# Patient Record
Sex: FEMALE | Race: WHITE | NOT HISPANIC OR LATINO | Employment: OTHER | URBAN - NONMETROPOLITAN AREA
[De-identification: names, ages, dates, MRNs, and addresses within clinical notes are randomized per-mention and may not be internally consistent; named-entity substitution may affect disease eponyms.]

---

## 2017-09-12 ENCOUNTER — TRANSCRIBE ORDERS (OUTPATIENT)
Dept: MAMMOGRAPHY | Facility: HOSPITAL | Age: 77
End: 2017-09-12

## 2017-09-12 DIAGNOSIS — Z13.9 SCREENING: Primary | ICD-10-CM

## 2017-10-01 ENCOUNTER — HOSPITAL ENCOUNTER (EMERGENCY)
Facility: HOSPITAL | Age: 77
Discharge: HOME | End: 2017-10-01
Attending: EMERGENCY MEDICINE | Admitting: EMERGENCY MEDICINE

## 2017-10-01 ENCOUNTER — APPOINTMENT (OUTPATIENT)
Dept: GENERAL RADIOLOGY | Facility: HOSPITAL | Age: 77
End: 2017-10-01

## 2017-10-01 VITALS
HEIGHT: 65 IN | DIASTOLIC BLOOD PRESSURE: 73 MMHG | WEIGHT: 160 LBS | OXYGEN SATURATION: 96 % | TEMPERATURE: 98.7 F | RESPIRATION RATE: 20 BRPM | BODY MASS INDEX: 26.66 KG/M2 | SYSTOLIC BLOOD PRESSURE: 138 MMHG | HEART RATE: 71 BPM

## 2017-10-01 DIAGNOSIS — L03.115 CELLULITIS OF FOOT, RIGHT: Primary | ICD-10-CM

## 2017-10-01 LAB
ANION GAP SERPL CALCULATED.3IONS-SCNC: 14 MMOL/L
BASOPHILS # BLD AUTO: 0.03 10*3/MM3 (ref 0–0.2)
BASOPHILS NFR BLD AUTO: 0.4 % (ref 0–2.5)
BUN BLD-MCNC: 24 MG/DL (ref 7–20)
BUN/CREAT SERPL: 21.8 (ref 7.1–23.5)
CALCIUM SPEC-SCNC: 9.1 MG/DL (ref 8.4–10.2)
CHLORIDE SERPL-SCNC: 107 MMOL/L (ref 98–107)
CO2 SERPL-SCNC: 26 MMOL/L (ref 26–30)
CREAT BLD-MCNC: 1.1 MG/DL (ref 0.6–1.3)
DEPRECATED RDW RBC AUTO: 47 FL (ref 37–54)
EOSINOPHIL # BLD AUTO: 0.1 10*3/MM3 (ref 0–0.7)
EOSINOPHIL NFR BLD AUTO: 1.2 % (ref 0–7)
ERYTHROCYTE [DISTWIDTH] IN BLOOD BY AUTOMATED COUNT: 13.3 % (ref 11.5–14.5)
GFR SERPL CREATININE-BSD FRML MDRD: 48 ML/MIN/1.73
GLUCOSE BLD-MCNC: 134 MG/DL (ref 74–98)
HCT VFR BLD AUTO: 35.4 % (ref 37–47)
HGB BLD-MCNC: 11.7 G/DL (ref 12–16)
IMM GRANULOCYTES # BLD: 0.03 10*3/MM3 (ref 0–0.06)
IMM GRANULOCYTES NFR BLD: 0.4 % (ref 0–0.6)
LYMPHOCYTES # BLD AUTO: 1.51 10*3/MM3 (ref 0.6–3.4)
LYMPHOCYTES NFR BLD AUTO: 18.6 % (ref 10–50)
MCH RBC QN AUTO: 31.6 PG (ref 27–31)
MCHC RBC AUTO-ENTMCNC: 33.1 G/DL (ref 30–37)
MCV RBC AUTO: 95.7 FL (ref 81–99)
MONOCYTES # BLD AUTO: 0.67 10*3/MM3 (ref 0–0.9)
MONOCYTES NFR BLD AUTO: 8.3 % (ref 0–12)
NEUTROPHILS # BLD AUTO: 5.77 10*3/MM3 (ref 2–6.9)
NEUTROPHILS NFR BLD AUTO: 71.1 % (ref 37–80)
NRBC BLD MANUAL-RTO: 0 /100 WBC (ref 0–0)
PLAT MORPH BLD: NORMAL
PLATELET # BLD AUTO: 200 10*3/MM3 (ref 130–400)
PMV BLD AUTO: 10.6 FL (ref 6–12)
POTASSIUM BLD-SCNC: 4 MMOL/L (ref 3.5–5.1)
RBC # BLD AUTO: 3.7 10*6/MM3 (ref 4.2–5.4)
RBC MORPH BLD: NORMAL
SODIUM BLD-SCNC: 143 MMOL/L (ref 137–145)
WBC MORPH BLD: NORMAL
WBC NRBC COR # BLD: 8.11 10*3/MM3 (ref 4.8–10.8)

## 2017-10-01 PROCEDURE — 80048 BASIC METABOLIC PNL TOTAL CA: CPT | Performed by: EMERGENCY MEDICINE

## 2017-10-01 PROCEDURE — 85025 COMPLETE CBC W/AUTO DIFF WBC: CPT | Performed by: EMERGENCY MEDICINE

## 2017-10-01 PROCEDURE — 87077 CULTURE AEROBIC IDENTIFY: CPT | Performed by: EMERGENCY MEDICINE

## 2017-10-01 PROCEDURE — 87070 CULTURE OTHR SPECIMN AEROBIC: CPT | Performed by: EMERGENCY MEDICINE

## 2017-10-01 PROCEDURE — 87186 SC STD MICRODIL/AGAR DIL: CPT | Performed by: EMERGENCY MEDICINE

## 2017-10-01 PROCEDURE — 87147 CULTURE TYPE IMMUNOLOGIC: CPT | Performed by: EMERGENCY MEDICINE

## 2017-10-01 PROCEDURE — 85007 BL SMEAR W/DIFF WBC COUNT: CPT | Performed by: EMERGENCY MEDICINE

## 2017-10-01 PROCEDURE — 99283 EMERGENCY DEPT VISIT LOW MDM: CPT

## 2017-10-01 PROCEDURE — 73630 X-RAY EXAM OF FOOT: CPT

## 2017-10-01 RX ORDER — SENNOSIDES 8.6 MG
650 CAPSULE ORAL EVERY 8 HOURS PRN
COMMUNITY

## 2017-10-01 RX ORDER — ROPINIROLE 0.25 MG/1
0.25 TABLET, FILM COATED ORAL 3 TIMES DAILY
COMMUNITY

## 2017-10-01 RX ORDER — PREGABALIN 100 MG/1
100 CAPSULE ORAL 2 TIMES DAILY
COMMUNITY

## 2017-10-01 RX ORDER — DOXYCYCLINE 100 MG/1
100 CAPSULE ORAL ONCE
Status: COMPLETED | OUTPATIENT
Start: 2017-10-01 | End: 2017-10-01

## 2017-10-01 RX ORDER — CEPHALEXIN 250 MG/1
500 CAPSULE ORAL ONCE
Status: COMPLETED | OUTPATIENT
Start: 2017-10-01 | End: 2017-10-01

## 2017-10-01 RX ORDER — ATENOLOL 50 MG/1
100 TABLET ORAL DAILY
COMMUNITY

## 2017-10-01 RX ORDER — ROPINIROLE 0.25 MG/1
0.25 TABLET, FILM COATED ORAL ONCE
Status: COMPLETED | OUTPATIENT
Start: 2017-10-01 | End: 2017-10-01

## 2017-10-01 RX ORDER — CEPHALEXIN 500 MG/1
500 CAPSULE ORAL 3 TIMES DAILY
Qty: 30 CAPSULE | Refills: 0 | Status: SHIPPED | OUTPATIENT
Start: 2017-10-01 | End: 2017-10-11

## 2017-10-01 RX ORDER — DOXYCYCLINE 50 MG/1
50 CAPSULE ORAL 2 TIMES DAILY
Qty: 20 CAPSULE | Refills: 0 | Status: SHIPPED | OUTPATIENT
Start: 2017-10-01 | End: 2017-10-11

## 2017-10-01 RX ADMIN — ROPINIROLE 0.25 MG: 0.25 TABLET, FILM COATED ORAL at 15:35

## 2017-10-01 RX ADMIN — CEPHALEXIN 500 MG: 250 CAPSULE ORAL at 16:01

## 2017-10-01 RX ADMIN — DOXYCYCLINE 100 MG: 100 CAPSULE ORAL at 16:01

## 2017-10-01 NOTE — ED NOTES
Right foot wound dressed with non adherent dressing, tolerated well. Verbalized understanding of wound care at home. Also verbalized understanding that she needs to be reevaluated if symptoms worsen, Dr. Byrd will be working tomorrow and family is aware that if she is to worsen they are to bring her back to consider admission at that time. Patient denies any feelings of illness at time of discharge, family was concerned about patient returning home because she is the caregiver for her , we discussed extensively that patient must rest in order to heal. Also discussed follow up with Dr. Sparrow, verbalized understanding.      Miriam Rodriguez RN  10/01/17 1304

## 2017-10-01 NOTE — ED PROVIDER NOTES
Subjective   History of Present Illness  TRIAGE CHIEF COMPLAINT:   Chief Complaint   Patient presents with   • Cellulitis         HPI: Latoya Molina   is a 77 y.o. female   who presents to the emergency department complaining of R foot infection. Patient is a very poor historian.  Has a history of hypertension, dyslipidemia, peripheral neuropathy.  Today patient noticed that her right foot was red and that her right second toe was swollen.  She is concern for infection.  Patient states she does not check her feet on a regular basis and is uncertain how long this has been going on.  As today is Sunday she was unable to go in and see her doctor so she came to the emergency department.  Denies any systemic signs or symptoms including fever, chills, nausea, vomiting, cough, chest pain, shortness of breath, abdominal pain.           Review of Systems   All other systems reviewed and are negative.      Past Medical History:   Diagnosis Date   • Arthritis    • Hyperlipidemia    • Hypertension    • Neuropathy    • Renal disorder        No Known Allergies    Past Surgical History:   Procedure Laterality Date   • BLADDER SURGERY     • CHOLECYSTECTOMY     • COLONOSCOPY     • HYSTERECTOMY     • IMPLANTATION VAGAL NERVE STIMULATOR     • RENAL MASS EXCISION     • REPLACEMENT TOTAL KNEE         History reviewed. No pertinent family history.    Social History     Social History   • Marital status:      Spouse name: N/A   • Number of children: N/A   • Years of education: N/A     Social History Main Topics   • Smoking status: Never Smoker   • Smokeless tobacco: None   • Alcohol use No   • Drug use: No   • Sexual activity: Not Asked     Other Topics Concern   • None     Social History Narrative   • None           Objective   Physical Exam    CONSTITUTIONAL: Awake, oriented, appears elderly, non-toxic   HENT: Atraumatic, normocephalic, oral mucosa pink and moist, airway patent. Nares patent without drainage. External ears  normal.   EYES: Conjunctiva clear, EOMI, PERRL   NECK: Trachea midline, non-tender, supple   CARDIOVASCULAR: Normal heart rate, Normal rhythm, No murmurs, rubs, gallops   PULMONARY/CHEST: Clear to auscultation, no rhonchi, wheezes, or rales. Symmetrical breath sounds. Non-tender.   ABDOMINAL: Non-distended, soft, non-tender - no rebound or guarding. BS normal.   NEUROLOGIC: Non-focal, moving all four extremities, no gross sensory or motor deficits.   EXTREMITIES: No clubbing, cyanosis, or edema   SKIN: Right second toe with mild diffuse swelling, distal lateral aspect with a small blister containing whitish colored fluid.  Blanching erythema and warmth extends over the dorsum of the foot and to the anterior ankle/lower pretibial region.  Overall appearance of cellulitis.     XR Foot 3+ View Right    (Results Pending)     XR foot: no obvious bony destruction / signs of osteo.       EKG:         Procedures         ED Course  ED Course      Blister on toe was poked with the tip of an 11 blade and fluid was sent for culture.    ED COURSE / MEDICAL DECISION MAKING:   Nursing notes reviewed.    Patient with right second toe infection and right foot cellulitis.  She is not currently on any antibiotics.  Discussed oral antibiotics versus IV antibiotics however patient does not wish to be admitted to the hospital.  Had a lengthy discussion and emphasized the importance of immediate return to the ER if the redness spreads or if she develops any systemic signs or symptoms.    DECISION TO DISCHARGE/ADMIT: see ED care timeline       Electronically signed by: Tyrone Byrd MD, 10/1/2017 4:08 PM              [facility]    Final diagnoses:   Cellulitis of foot, right            Tyrone Byrd MD  10/01/17 1608

## 2017-10-03 LAB
BACTERIA SPEC AEROBE CULT: ABNORMAL
BACTERIA SPEC AEROBE CULT: ABNORMAL

## 2017-10-09 ENCOUNTER — APPOINTMENT (OUTPATIENT)
Dept: MAMMOGRAPHY | Facility: HOSPITAL | Age: 77
End: 2017-10-09

## 2017-10-11 ENCOUNTER — HOSPITAL ENCOUNTER (OUTPATIENT)
Dept: MAMMOGRAPHY | Facility: HOSPITAL | Age: 77
Discharge: HOME | End: 2017-10-11
Admitting: FAMILY MEDICINE

## 2017-10-11 DIAGNOSIS — Z13.9 SCREENING: ICD-10-CM

## 2017-10-11 PROCEDURE — G0202 SCR MAMMO BI INCL CAD: HCPCS

## 2017-10-11 PROCEDURE — 77063 BREAST TOMOSYNTHESIS BI: CPT

## 2017-10-27 ENCOUNTER — HOSPITAL ENCOUNTER (INPATIENT)
Facility: HOSPITAL | Age: 77
LOS: 1 days | Discharge: HOME | End: 2017-10-30
Attending: EMERGENCY MEDICINE | Admitting: INTERNAL MEDICINE

## 2017-10-27 ENCOUNTER — APPOINTMENT (OUTPATIENT)
Dept: GENERAL RADIOLOGY | Facility: HOSPITAL | Age: 77
End: 2017-10-27

## 2017-10-27 DIAGNOSIS — Z74.09 IMPAIRED FUNCTIONAL MOBILITY, BALANCE, GAIT, AND ENDURANCE: ICD-10-CM

## 2017-10-27 DIAGNOSIS — N17.9 ACUTE KIDNEY INJURY (HCC): Primary | ICD-10-CM

## 2017-10-27 DIAGNOSIS — I95.9 HYPOTENSION, UNSPECIFIED HYPOTENSION TYPE: ICD-10-CM

## 2017-10-27 LAB
ALBUMIN SERPL-MCNC: 3.7 G/DL (ref 3.5–5)
ALBUMIN/GLOB SERPL: 1.2 G/DL (ref 1–2)
ALP SERPL-CCNC: 58 U/L (ref 38–126)
ALT SERPL W P-5'-P-CCNC: 26 U/L (ref 13–69)
ANION GAP SERPL CALCULATED.3IONS-SCNC: 20.4 MMOL/L
AST SERPL-CCNC: 29 U/L (ref 15–46)
BASOPHILS # BLD AUTO: 0.02 10*3/MM3 (ref 0–0.2)
BASOPHILS NFR BLD AUTO: 0.2 % (ref 0–2.5)
BILIRUB SERPL-MCNC: 0.5 MG/DL (ref 0.2–1.3)
BILIRUB UR QL STRIP: ABNORMAL
BUN BLD-MCNC: 40 MG/DL (ref 7–20)
BUN/CREAT SERPL: 16.7 (ref 7.1–23.5)
CALCIUM SPEC-SCNC: 8.9 MG/DL (ref 8.4–10.2)
CHLORIDE SERPL-SCNC: 102 MMOL/L (ref 98–107)
CLARITY UR: CLEAR
CO2 SERPL-SCNC: 20 MMOL/L (ref 26–30)
COLOR UR: ABNORMAL
CREAT BLD-MCNC: 2.4 MG/DL (ref 0.6–1.3)
DEPRECATED RDW RBC AUTO: 47.4 FL (ref 37–54)
EOSINOPHIL # BLD AUTO: 0.31 10*3/MM3 (ref 0–0.7)
EOSINOPHIL NFR BLD AUTO: 2.4 % (ref 0–7)
ERYTHROCYTE [DISTWIDTH] IN BLOOD BY AUTOMATED COUNT: 13.7 % (ref 11.5–14.5)
GFR SERPL CREATININE-BSD FRML MDRD: 19 ML/MIN/1.73
GLOBULIN UR ELPH-MCNC: 3 GM/DL
GLUCOSE BLD-MCNC: 124 MG/DL (ref 74–98)
GLUCOSE UR STRIP-MCNC: NEGATIVE MG/DL
HCT VFR BLD AUTO: 40.5 % (ref 37–47)
HGB BLD-MCNC: 13.3 G/DL (ref 12–16)
HGB UR QL STRIP.AUTO: NEGATIVE
HOLD SPECIMEN: NORMAL
IMM GRANULOCYTES # BLD: 0.1 10*3/MM3 (ref 0–0.06)
IMM GRANULOCYTES NFR BLD: 0.8 % (ref 0–0.6)
KETONES UR QL STRIP: ABNORMAL
LEUKOCYTE ESTERASE UR QL STRIP.AUTO: NEGATIVE
LYMPHOCYTES # BLD AUTO: 0.21 10*3/MM3 (ref 0.6–3.4)
LYMPHOCYTES NFR BLD AUTO: 1.6 % (ref 10–50)
MCH RBC QN AUTO: 31 PG (ref 27–31)
MCHC RBC AUTO-ENTMCNC: 32.8 G/DL (ref 30–37)
MCV RBC AUTO: 94.4 FL (ref 81–99)
MONOCYTES # BLD AUTO: 0.59 10*3/MM3 (ref 0–0.9)
MONOCYTES NFR BLD AUTO: 4.6 % (ref 0–12)
NEUTROPHILS # BLD AUTO: 11.69 10*3/MM3 (ref 2–6.9)
NEUTROPHILS NFR BLD AUTO: 90.4 % (ref 37–80)
NITRITE UR QL STRIP: NEGATIVE
NRBC BLD MANUAL-RTO: 0 /100 WBC (ref 0–0)
PH UR STRIP.AUTO: 5.5 [PH] (ref 5–8)
PLATELET # BLD AUTO: 161 10*3/MM3 (ref 130–400)
PMV BLD AUTO: 11.2 FL (ref 6–12)
POTASSIUM BLD-SCNC: 4.4 MMOL/L (ref 3.5–5.1)
PROT SERPL-MCNC: 6.7 G/DL (ref 6.3–8.2)
PROT UR QL STRIP: NEGATIVE
RBC # BLD AUTO: 4.29 10*6/MM3 (ref 4.2–5.4)
SODIUM BLD-SCNC: 138 MMOL/L (ref 137–145)
SP GR UR STRIP: 1.02 (ref 1–1.03)
TROPONIN I SERPL-MCNC: <0.012 NG/ML (ref 0–0.03)
UROBILINOGEN UR QL STRIP: ABNORMAL
WBC NRBC COR # BLD: 12.92 10*3/MM3 (ref 4.8–10.8)
WHOLE BLOOD HOLD SPECIMEN: NORMAL
WHOLE BLOOD HOLD SPECIMEN: NORMAL

## 2017-10-27 PROCEDURE — 25010000002 ONDANSETRON PER 1 MG: Performed by: NURSE PRACTITIONER

## 2017-10-27 PROCEDURE — 93005 ELECTROCARDIOGRAM TRACING: CPT | Performed by: EMERGENCY MEDICINE

## 2017-10-27 PROCEDURE — 84484 ASSAY OF TROPONIN QUANT: CPT | Performed by: NURSE PRACTITIONER

## 2017-10-27 PROCEDURE — 71020 HC CHEST PA AND LATERAL: CPT

## 2017-10-27 PROCEDURE — 80053 COMPREHEN METABOLIC PANEL: CPT | Performed by: EMERGENCY MEDICINE

## 2017-10-27 PROCEDURE — 99285 EMERGENCY DEPT VISIT HI MDM: CPT

## 2017-10-27 PROCEDURE — 81003 URINALYSIS AUTO W/O SCOPE: CPT | Performed by: EMERGENCY MEDICINE

## 2017-10-27 PROCEDURE — 85025 COMPLETE CBC W/AUTO DIFF WBC: CPT | Performed by: EMERGENCY MEDICINE

## 2017-10-27 PROCEDURE — G0378 HOSPITAL OBSERVATION PER HR: HCPCS

## 2017-10-27 RX ORDER — ONDANSETRON 2 MG/ML
4 INJECTION INTRAMUSCULAR; INTRAVENOUS ONCE
Status: COMPLETED | OUTPATIENT
Start: 2017-10-27 | End: 2017-10-27

## 2017-10-27 RX ORDER — SODIUM CHLORIDE 0.9 % (FLUSH) 0.9 %
10 SYRINGE (ML) INJECTION AS NEEDED
Status: DISCONTINUED | OUTPATIENT
Start: 2017-10-27 | End: 2017-10-28

## 2017-10-27 RX ADMIN — ONDANSETRON 4 MG: 2 INJECTION INTRAMUSCULAR; INTRAVENOUS at 21:00

## 2017-10-27 RX ADMIN — SODIUM CHLORIDE 1000 ML: 9 INJECTION, SOLUTION INTRAVENOUS at 22:07

## 2017-10-27 RX ADMIN — SODIUM CHLORIDE 1000 ML: 9 INJECTION, SOLUTION INTRAVENOUS at 20:40

## 2017-10-28 LAB
ANION GAP SERPL CALCULATED.3IONS-SCNC: 13.2 MMOL/L
BUN BLD-MCNC: 33 MG/DL (ref 7–20)
BUN/CREAT SERPL: 16.5 (ref 7.1–23.5)
CALCIUM SPEC-SCNC: 7.4 MG/DL (ref 8.4–10.2)
CHLORIDE SERPL-SCNC: 111 MMOL/L (ref 98–107)
CO2 SERPL-SCNC: 18 MMOL/L (ref 26–30)
CREAT BLD-MCNC: 2 MG/DL (ref 0.6–1.3)
GFR SERPL CREATININE-BSD FRML MDRD: 24 ML/MIN/1.73
GLUCOSE BLD-MCNC: 93 MG/DL (ref 74–98)
POTASSIUM BLD-SCNC: 4.2 MMOL/L (ref 3.5–5.1)
SODIUM BLD-SCNC: 138 MMOL/L (ref 137–145)
SODIUM UR-SCNC: 22 MMOL/L (ref 30–90)
TSH SERPL DL<=0.05 MIU/L-ACNC: 1.15 MIU/ML (ref 0.47–4.68)

## 2017-10-28 PROCEDURE — 80048 BASIC METABOLIC PNL TOTAL CA: CPT | Performed by: INTERNAL MEDICINE

## 2017-10-28 PROCEDURE — 84300 ASSAY OF URINE SODIUM: CPT | Performed by: INTERNAL MEDICINE

## 2017-10-28 PROCEDURE — 83935 ASSAY OF URINE OSMOLALITY: CPT | Performed by: INTERNAL MEDICINE

## 2017-10-28 PROCEDURE — 84443 ASSAY THYROID STIM HORMONE: CPT | Performed by: INTERNAL MEDICINE

## 2017-10-28 PROCEDURE — 99220 PR INITIAL OBSERVATION CARE/DAY 70 MINUTES: CPT | Performed by: INTERNAL MEDICINE

## 2017-10-28 PROCEDURE — 83930 ASSAY OF BLOOD OSMOLALITY: CPT | Performed by: INTERNAL MEDICINE

## 2017-10-28 PROCEDURE — G0378 HOSPITAL OBSERVATION PER HR: HCPCS

## 2017-10-28 PROCEDURE — 25010000002 ENOXAPARIN PER 10 MG: Performed by: INTERNAL MEDICINE

## 2017-10-28 RX ORDER — SODIUM CHLORIDE 0.9 % (FLUSH) 0.9 %
1-10 SYRINGE (ML) INJECTION AS NEEDED
Status: DISCONTINUED | OUTPATIENT
Start: 2017-10-28 | End: 2017-10-30 | Stop reason: HOSPADM

## 2017-10-28 RX ORDER — ROPINIROLE 0.25 MG/1
0.25 TABLET, FILM COATED ORAL 3 TIMES DAILY
Status: DISCONTINUED | OUTPATIENT
Start: 2017-10-28 | End: 2017-10-28

## 2017-10-28 RX ORDER — PREGABALIN 50 MG/1
100 CAPSULE ORAL 2 TIMES DAILY
Status: DISCONTINUED | OUTPATIENT
Start: 2017-10-28 | End: 2017-10-30 | Stop reason: HOSPADM

## 2017-10-28 RX ORDER — ATENOLOL 50 MG/1
50 TABLET ORAL DAILY
Status: DISCONTINUED | OUTPATIENT
Start: 2017-10-28 | End: 2017-10-30 | Stop reason: HOSPADM

## 2017-10-28 RX ORDER — ROPINIROLE 0.25 MG/1
0.25 TABLET, FILM COATED ORAL 3 TIMES DAILY
Status: DISCONTINUED | OUTPATIENT
Start: 2017-10-28 | End: 2017-10-30 | Stop reason: HOSPADM

## 2017-10-28 RX ORDER — SODIUM CHLORIDE 9 MG/ML
100 INJECTION, SOLUTION INTRAVENOUS CONTINUOUS
Status: DISCONTINUED | OUTPATIENT
Start: 2017-10-28 | End: 2017-10-30 | Stop reason: HOSPADM

## 2017-10-28 RX ORDER — SENNOSIDES 8.6 MG
650 CAPSULE ORAL EVERY 8 HOURS PRN
Status: DISCONTINUED | OUTPATIENT
Start: 2017-10-28 | End: 2017-10-30 | Stop reason: HOSPADM

## 2017-10-28 RX ORDER — PREGABALIN 50 MG/1
100 CAPSULE ORAL 2 TIMES DAILY
Status: DISCONTINUED | OUTPATIENT
Start: 2017-10-28 | End: 2017-10-28

## 2017-10-28 RX ADMIN — PREGABALIN 100 MG: 50 CAPSULE ORAL at 18:11

## 2017-10-28 RX ADMIN — SODIUM CHLORIDE 100 ML/HR: 9 INJECTION, SOLUTION INTRAVENOUS at 14:39

## 2017-10-28 RX ADMIN — Medication 650 MG: at 03:11

## 2017-10-28 RX ADMIN — ROPINIROLE 0.25 MG: 0.25 TABLET, FILM COATED ORAL at 02:00

## 2017-10-28 RX ADMIN — ROPINIROLE 0.25 MG: 0.25 TABLET, FILM COATED ORAL at 08:26

## 2017-10-28 RX ADMIN — PREGABALIN 100 MG: 50 CAPSULE ORAL at 08:27

## 2017-10-28 RX ADMIN — PREGABALIN 100 MG: 50 CAPSULE ORAL at 02:00

## 2017-10-28 RX ADMIN — ENOXAPARIN SODIUM 30 MG: 30 INJECTION SUBCUTANEOUS at 08:26

## 2017-10-28 RX ADMIN — SODIUM CHLORIDE 500 ML: 9 INJECTION, SOLUTION INTRAVENOUS at 14:14

## 2017-10-28 RX ADMIN — SODIUM CHLORIDE 100 ML/HR: 9 INJECTION, SOLUTION INTRAVENOUS at 02:45

## 2017-10-28 RX ADMIN — ROPINIROLE 0.25 MG: 0.25 TABLET, FILM COATED ORAL at 15:03

## 2017-10-28 RX ADMIN — SODIUM CHLORIDE 1000 ML: 9 INJECTION, SOLUTION INTRAVENOUS at 02:36

## 2017-10-28 RX ADMIN — ESTROGENS, CONJUGATED 0.6 MG: 0.3 TABLET, FILM COATED ORAL at 08:26

## 2017-10-28 RX ADMIN — ROPINIROLE 0.25 MG: 0.25 TABLET, FILM COATED ORAL at 20:13

## 2017-10-28 RX ADMIN — ATENOLOL 50 MG: 50 TABLET ORAL at 08:27

## 2017-10-29 LAB
ALBUMIN SERPL-MCNC: 2.7 G/DL (ref 3.5–5)
ANION GAP SERPL CALCULATED.3IONS-SCNC: 9.3 MMOL/L
BASOPHILS # BLD AUTO: 0.01 10*3/MM3 (ref 0–0.2)
BASOPHILS NFR BLD AUTO: 0.2 % (ref 0–2.5)
BUN BLD-MCNC: 24 MG/DL (ref 7–20)
BUN/CREAT SERPL: 15 (ref 7.1–23.5)
CALCIUM SPEC-SCNC: 7.4 MG/DL (ref 8.4–10.2)
CHLORIDE SERPL-SCNC: 115 MMOL/L (ref 98–107)
CO2 SERPL-SCNC: 21 MMOL/L (ref 26–30)
CREAT BLD-MCNC: 1.6 MG/DL (ref 0.6–1.3)
DEPRECATED RDW RBC AUTO: 49.8 FL (ref 37–54)
EOSINOPHIL # BLD AUTO: 0.28 10*3/MM3 (ref 0–0.7)
EOSINOPHIL NFR BLD AUTO: 6.5 % (ref 0–7)
ERYTHROCYTE [DISTWIDTH] IN BLOOD BY AUTOMATED COUNT: 13.9 % (ref 11.5–14.5)
GFR SERPL CREATININE-BSD FRML MDRD: 31 ML/MIN/1.73
GLUCOSE BLD-MCNC: 80 MG/DL (ref 74–98)
HCT VFR BLD AUTO: 31.3 % (ref 37–47)
HGB BLD-MCNC: 10 G/DL (ref 12–16)
IMM GRANULOCYTES # BLD: 0.01 10*3/MM3 (ref 0–0.06)
IMM GRANULOCYTES NFR BLD: 0.2 % (ref 0–0.6)
LYMPHOCYTES # BLD AUTO: 0.73 10*3/MM3 (ref 0.6–3.4)
LYMPHOCYTES NFR BLD AUTO: 16.9 % (ref 10–50)
MCH RBC QN AUTO: 30.8 PG (ref 27–31)
MCHC RBC AUTO-ENTMCNC: 31.9 G/DL (ref 30–37)
MCV RBC AUTO: 96.3 FL (ref 81–99)
MONOCYTES # BLD AUTO: 0.39 10*3/MM3 (ref 0–0.9)
MONOCYTES NFR BLD AUTO: 9 % (ref 0–12)
NEUTROPHILS # BLD AUTO: 2.91 10*3/MM3 (ref 2–6.9)
NEUTROPHILS NFR BLD AUTO: 67.2 % (ref 37–80)
NRBC BLD MANUAL-RTO: 0 /100 WBC (ref 0–0)
PHOSPHATE SERPL-MCNC: 2.2 MG/DL (ref 2.5–4.5)
PLATELET # BLD AUTO: 135 10*3/MM3 (ref 130–400)
PMV BLD AUTO: 11.4 FL (ref 6–12)
POTASSIUM BLD-SCNC: 4.3 MMOL/L (ref 3.5–5.1)
RBC # BLD AUTO: 3.25 10*6/MM3 (ref 4.2–5.4)
SODIUM BLD-SCNC: 141 MMOL/L (ref 137–145)
WBC NRBC COR # BLD: 4.33 10*3/MM3 (ref 4.8–10.8)

## 2017-10-29 PROCEDURE — 99232 SBSQ HOSP IP/OBS MODERATE 35: CPT | Performed by: INTERNAL MEDICINE

## 2017-10-29 PROCEDURE — 85025 COMPLETE CBC W/AUTO DIFF WBC: CPT | Performed by: INTERNAL MEDICINE

## 2017-10-29 PROCEDURE — 80069 RENAL FUNCTION PANEL: CPT | Performed by: INTERNAL MEDICINE

## 2017-10-29 PROCEDURE — 97162 PT EVAL MOD COMPLEX 30 MIN: CPT

## 2017-10-29 PROCEDURE — 25010000002 ENOXAPARIN PER 10 MG: Performed by: INTERNAL MEDICINE

## 2017-10-29 RX ORDER — CEPHALEXIN 250 MG/1
500 CAPSULE ORAL EVERY 8 HOURS SCHEDULED
Status: DISCONTINUED | OUTPATIENT
Start: 2017-10-29 | End: 2017-10-30 | Stop reason: HOSPADM

## 2017-10-29 RX ADMIN — ROPINIROLE 0.25 MG: 0.25 TABLET, FILM COATED ORAL at 16:07

## 2017-10-29 RX ADMIN — CEPHALEXIN 500 MG: 250 CAPSULE ORAL at 14:30

## 2017-10-29 RX ADMIN — SODIUM CHLORIDE 100 ML/HR: 9 INJECTION, SOLUTION INTRAVENOUS at 10:07

## 2017-10-29 RX ADMIN — PREGABALIN 100 MG: 50 CAPSULE ORAL at 17:47

## 2017-10-29 RX ADMIN — ATENOLOL 50 MG: 50 TABLET ORAL at 09:11

## 2017-10-29 RX ADMIN — ROPINIROLE 0.25 MG: 0.25 TABLET, FILM COATED ORAL at 09:11

## 2017-10-29 RX ADMIN — ROPINIROLE 0.25 MG: 0.25 TABLET, FILM COATED ORAL at 21:03

## 2017-10-29 RX ADMIN — PREGABALIN 100 MG: 50 CAPSULE ORAL at 09:11

## 2017-10-29 RX ADMIN — ESTROGENS, CONJUGATED 0.6 MG: 0.3 TABLET, FILM COATED ORAL at 09:11

## 2017-10-29 RX ADMIN — SODIUM CHLORIDE 100 ML/HR: 9 INJECTION, SOLUTION INTRAVENOUS at 00:15

## 2017-10-29 RX ADMIN — Medication 650 MG: at 04:35

## 2017-10-29 RX ADMIN — CEPHALEXIN 500 MG: 250 CAPSULE ORAL at 21:03

## 2017-10-29 RX ADMIN — ENOXAPARIN SODIUM 30 MG: 30 INJECTION SUBCUTANEOUS at 09:11

## 2017-10-30 VITALS
HEART RATE: 61 BPM | RESPIRATION RATE: 18 BRPM | SYSTOLIC BLOOD PRESSURE: 146 MMHG | DIASTOLIC BLOOD PRESSURE: 64 MMHG | TEMPERATURE: 97.7 F | WEIGHT: 177.2 LBS | BODY MASS INDEX: 28.48 KG/M2 | OXYGEN SATURATION: 97 % | HEIGHT: 66 IN

## 2017-10-30 PROBLEM — N17.9 ACUTE RENAL FAILURE (HCC): Status: ACTIVE | Noted: 2017-10-30

## 2017-10-30 PROBLEM — L03.031: Status: ACTIVE | Noted: 2017-10-30

## 2017-10-30 PROBLEM — T50.905A: Status: ACTIVE | Noted: 2017-10-30

## 2017-10-30 PROBLEM — B35.1 ONYCHOMYCOSIS OF TOENAIL: Status: ACTIVE | Noted: 2017-10-30

## 2017-10-30 PROBLEM — I10 ESSENTIAL HYPERTENSION: Chronic | Status: ACTIVE | Noted: 2017-10-30

## 2017-10-30 PROBLEM — N17.9 ACUTE KIDNEY INJURY (HCC): Status: RESOLVED | Noted: 2017-10-27 | Resolved: 2017-10-30

## 2017-10-30 LAB
ALBUMIN SERPL-MCNC: 2.6 G/DL (ref 3.5–5)
ANION GAP SERPL CALCULATED.3IONS-SCNC: 11 MMOL/L
BUN BLD-MCNC: 19 MG/DL (ref 7–20)
BUN/CREAT SERPL: 15.8 (ref 7.1–23.5)
CALCIUM SPEC-SCNC: 7.9 MG/DL (ref 8.4–10.2)
CHLORIDE SERPL-SCNC: 116 MMOL/L (ref 98–107)
CO2 SERPL-SCNC: 20 MMOL/L (ref 26–30)
CREAT BLD-MCNC: 1.2 MG/DL (ref 0.6–1.3)
DEPRECATED RDW RBC AUTO: 48.5 FL (ref 37–54)
ERYTHROCYTE [DISTWIDTH] IN BLOOD BY AUTOMATED COUNT: 13.7 % (ref 11.5–14.5)
GFR SERPL CREATININE-BSD FRML MDRD: 43 ML/MIN/1.73
GLUCOSE BLD-MCNC: 84 MG/DL (ref 74–98)
HCT VFR BLD AUTO: 27.8 % (ref 37–47)
HGB BLD-MCNC: 9.1 G/DL (ref 12–16)
MAGNESIUM SERPL-MCNC: 2.2 MG/DL (ref 1.6–2.3)
MCH RBC QN AUTO: 31.3 PG (ref 27–31)
MCHC RBC AUTO-ENTMCNC: 32.7 G/DL (ref 30–37)
MCV RBC AUTO: 95.5 FL (ref 81–99)
OSMOLALITY SERPL: 283 MOSMOL/KG (ref 280–301)
PHOSPHATE SERPL-MCNC: 2.6 MG/DL (ref 2.5–4.5)
PLATELET # BLD AUTO: 130 10*3/MM3 (ref 130–400)
PMV BLD AUTO: 11.1 FL (ref 6–12)
POTASSIUM BLD-SCNC: 4 MMOL/L (ref 3.5–5.1)
RBC # BLD AUTO: 2.91 10*6/MM3 (ref 4.2–5.4)
SODIUM BLD-SCNC: 143 MMOL/L (ref 137–145)
WBC NRBC COR # BLD: 3.91 10*3/MM3 (ref 4.8–10.8)

## 2017-10-30 PROCEDURE — 80069 RENAL FUNCTION PANEL: CPT | Performed by: INTERNAL MEDICINE

## 2017-10-30 PROCEDURE — 99239 HOSP IP/OBS DSCHRG MGMT >30: CPT | Performed by: FAMILY MEDICINE

## 2017-10-30 PROCEDURE — 25010000002 PNEUMOCOCCAL VAC POLYVALENT PER 0.5 ML: Performed by: INTERNAL MEDICINE

## 2017-10-30 PROCEDURE — 90732 PPSV23 VACC 2 YRS+ SUBQ/IM: CPT | Performed by: INTERNAL MEDICINE

## 2017-10-30 PROCEDURE — 85027 COMPLETE CBC AUTOMATED: CPT | Performed by: INTERNAL MEDICINE

## 2017-10-30 PROCEDURE — 83735 ASSAY OF MAGNESIUM: CPT | Performed by: INTERNAL MEDICINE

## 2017-10-30 PROCEDURE — G0009 ADMIN PNEUMOCOCCAL VACCINE: HCPCS | Performed by: INTERNAL MEDICINE

## 2017-10-30 PROCEDURE — 25010000002 ENOXAPARIN PER 10 MG: Performed by: INTERNAL MEDICINE

## 2017-10-30 RX ORDER — CEPHALEXIN 500 MG/1
500 CAPSULE ORAL EVERY 8 HOURS SCHEDULED
Qty: 30 CAPSULE | Refills: 0 | Status: SHIPPED | OUTPATIENT
Start: 2017-10-30 | End: 2018-07-29

## 2017-10-30 RX ORDER — CLOTRIMAZOLE 1 %
CREAM (GRAM) TOPICAL 2 TIMES DAILY
Qty: 113 G | Refills: 2 | Status: ON HOLD | OUTPATIENT
Start: 2017-10-30 | End: 2018-07-29

## 2017-10-30 RX ADMIN — ROPINIROLE 0.25 MG: 0.25 TABLET, FILM COATED ORAL at 08:35

## 2017-10-30 RX ADMIN — ATENOLOL 50 MG: 50 TABLET ORAL at 08:35

## 2017-10-30 RX ADMIN — CEPHALEXIN 500 MG: 250 CAPSULE ORAL at 06:02

## 2017-10-30 RX ADMIN — ENOXAPARIN SODIUM 30 MG: 30 INJECTION SUBCUTANEOUS at 08:35

## 2017-10-30 RX ADMIN — ESTROGENS, CONJUGATED 0.6 MG: 0.3 TABLET, FILM COATED ORAL at 08:35

## 2017-10-30 RX ADMIN — Medication 650 MG: at 03:13

## 2017-10-30 RX ADMIN — PREGABALIN 100 MG: 50 CAPSULE ORAL at 08:35

## 2017-10-30 RX ADMIN — SODIUM CHLORIDE 100 ML/HR: 9 INJECTION, SOLUTION INTRAVENOUS at 06:03

## 2017-10-30 RX ADMIN — PNEUMOCOCCAL VACCINE POLYVALENT 0.5 ML
25; 25; 25; 25; 25; 25; 25; 25; 25; 25; 25; 25; 25; 25; 25; 25; 25; 25; 25; 25; 25; 25; 25 INJECTION, SOLUTION INTRAMUSCULAR; SUBCUTANEOUS at 12:21

## 2017-10-31 LAB — OSMOLALITY UR: 202 MOSMOL/KG

## 2017-11-27 ENCOUNTER — APPOINTMENT (OUTPATIENT)
Dept: LAB | Facility: HOSPITAL | Age: 77
End: 2017-11-27

## 2017-11-27 ENCOUNTER — TRANSCRIBE ORDERS (OUTPATIENT)
Dept: LAB | Facility: HOSPITAL | Age: 77
End: 2017-11-27

## 2017-11-27 DIAGNOSIS — L03.116 CELLULITIS OF LEFT FOOT: ICD-10-CM

## 2017-11-27 DIAGNOSIS — L03.032 ABSCESS OF FIFTH TOENAIL OF LEFT FOOT: Primary | ICD-10-CM

## 2017-11-27 LAB
ALBUMIN SERPL-MCNC: 3.8 G/DL (ref 3.2–4.8)
ALBUMIN/GLOB SERPL: 1.5 G/DL (ref 1.5–2.5)
ALP SERPL-CCNC: 70 U/L (ref 25–100)
ALT SERPL W P-5'-P-CCNC: 14 U/L (ref 7–40)
ANION GAP SERPL CALCULATED.3IONS-SCNC: 1 MMOL/L (ref 3–11)
AST SERPL-CCNC: 18 U/L (ref 0–33)
BASOPHILS # BLD AUTO: 0.02 10*3/MM3 (ref 0–0.2)
BASOPHILS NFR BLD AUTO: 0.3 % (ref 0–1)
BILIRUB SERPL-MCNC: 0.3 MG/DL (ref 0.3–1.2)
BUN BLD-MCNC: 19 MG/DL (ref 9–23)
BUN/CREAT SERPL: 17.3 (ref 7–25)
CALCIUM SPEC-SCNC: 8.7 MG/DL (ref 8.7–10.4)
CHLORIDE SERPL-SCNC: 104 MMOL/L (ref 99–109)
CO2 SERPL-SCNC: 33 MMOL/L (ref 20–31)
CREAT BLD-MCNC: 1.1 MG/DL (ref 0.6–1.3)
CRP SERPL-MCNC: 1.06 MG/DL (ref 0–1)
DEPRECATED RDW RBC AUTO: 46.5 FL (ref 37–54)
EOSINOPHIL # BLD AUTO: 0.2 10*3/MM3 (ref 0–0.3)
EOSINOPHIL NFR BLD AUTO: 3.2 % (ref 0–3)
ERYTHROCYTE [DISTWIDTH] IN BLOOD BY AUTOMATED COUNT: 13.4 % (ref 11.3–14.5)
ERYTHROCYTE [SEDIMENTATION RATE] IN BLOOD: 28 MM/HR (ref 0–30)
GFR SERPL CREATININE-BSD FRML MDRD: 48 ML/MIN/1.73
GLOBULIN UR ELPH-MCNC: 2.6 GM/DL
GLUCOSE BLD-MCNC: 92 MG/DL (ref 70–100)
HCT VFR BLD AUTO: 38.7 % (ref 34.5–44)
HGB BLD-MCNC: 12.6 G/DL (ref 11.5–15.5)
IMM GRANULOCYTES # BLD: 0.01 10*3/MM3 (ref 0–0.03)
IMM GRANULOCYTES NFR BLD: 0.2 % (ref 0–0.6)
LYMPHOCYTES # BLD AUTO: 2.01 10*3/MM3 (ref 0.6–4.8)
LYMPHOCYTES NFR BLD AUTO: 31.8 % (ref 24–44)
MCH RBC QN AUTO: 30.7 PG (ref 27–31)
MCHC RBC AUTO-ENTMCNC: 32.6 G/DL (ref 32–36)
MCV RBC AUTO: 94.2 FL (ref 80–99)
MONOCYTES # BLD AUTO: 0.61 10*3/MM3 (ref 0–1)
MONOCYTES NFR BLD AUTO: 9.7 % (ref 0–12)
NEUTROPHILS # BLD AUTO: 3.47 10*3/MM3 (ref 1.5–8.3)
NEUTROPHILS NFR BLD AUTO: 54.8 % (ref 41–71)
PLATELET # BLD AUTO: 230 10*3/MM3 (ref 150–450)
PMV BLD AUTO: 10.5 FL (ref 6–12)
POTASSIUM BLD-SCNC: 4.2 MMOL/L (ref 3.5–5.5)
PROT SERPL-MCNC: 6.4 G/DL (ref 5.7–8.2)
RBC # BLD AUTO: 4.11 10*6/MM3 (ref 3.89–5.14)
SODIUM BLD-SCNC: 138 MMOL/L (ref 132–146)
WBC NRBC COR # BLD: 6.32 10*3/MM3 (ref 3.5–10.8)

## 2017-11-27 PROCEDURE — 85652 RBC SED RATE AUTOMATED: CPT | Performed by: INTERNAL MEDICINE

## 2017-11-27 PROCEDURE — 36415 COLL VENOUS BLD VENIPUNCTURE: CPT | Performed by: INTERNAL MEDICINE

## 2017-11-27 PROCEDURE — 86140 C-REACTIVE PROTEIN: CPT | Performed by: INTERNAL MEDICINE

## 2017-11-27 PROCEDURE — 85025 COMPLETE CBC W/AUTO DIFF WBC: CPT | Performed by: INTERNAL MEDICINE

## 2017-11-27 PROCEDURE — 80053 COMPREHEN METABOLIC PANEL: CPT | Performed by: INTERNAL MEDICINE

## 2017-11-28 ENCOUNTER — TRANSCRIBE ORDERS (OUTPATIENT)
Dept: ADMINISTRATIVE | Facility: HOSPITAL | Age: 77
End: 2017-11-28

## 2017-11-28 DIAGNOSIS — L02.619 CELLULITIS AND ABSCESS OF FOOT: Primary | ICD-10-CM

## 2017-11-28 DIAGNOSIS — L03.119 CELLULITIS AND ABSCESS OF FOOT: Primary | ICD-10-CM

## 2017-12-04 ENCOUNTER — HOSPITAL ENCOUNTER (OUTPATIENT)
Dept: ULTRASOUND IMAGING | Facility: HOSPITAL | Age: 77
Discharge: HOME | End: 2017-12-04
Admitting: INTERNAL MEDICINE

## 2017-12-04 ENCOUNTER — HOSPITAL ENCOUNTER (OUTPATIENT)
Dept: NUCLEAR MEDICINE | Facility: HOSPITAL | Age: 77
Discharge: HOME | End: 2017-12-04

## 2017-12-04 DIAGNOSIS — L02.619 CELLULITIS AND ABSCESS OF FOOT: ICD-10-CM

## 2017-12-04 DIAGNOSIS — L03.119 CELLULITIS AND ABSCESS OF FOOT: ICD-10-CM

## 2017-12-04 PROCEDURE — A9503 TC99M MEDRONATE: HCPCS | Performed by: INTERNAL MEDICINE

## 2017-12-04 PROCEDURE — 93923 UPR/LXTR ART STDY 3+ LVLS: CPT

## 2017-12-04 PROCEDURE — 0 TECHNETIUM MEDRONATE KIT: Performed by: INTERNAL MEDICINE

## 2017-12-04 PROCEDURE — 78315 BONE IMAGING 3 PHASE: CPT

## 2017-12-04 RX ORDER — TC 99M MEDRONATE 20 MG/10ML
24.1 INJECTION, POWDER, LYOPHILIZED, FOR SOLUTION INTRAVENOUS
Status: COMPLETED | OUTPATIENT
Start: 2017-12-04 | End: 2017-12-04

## 2017-12-04 RX ADMIN — TC 99M MEDRONATE 24.1 MILLICURIE: 20 INJECTION, POWDER, LYOPHILIZED, FOR SOLUTION INTRAVENOUS at 11:30

## 2018-03-21 ENCOUNTER — TRANSCRIBE ORDERS (OUTPATIENT)
Dept: ADMINISTRATIVE | Facility: HOSPITAL | Age: 78
End: 2018-03-21

## 2018-03-21 DIAGNOSIS — Z12.39 SCREENING BREAST EXAMINATION: Primary | ICD-10-CM

## 2018-07-29 ENCOUNTER — HOSPITAL ENCOUNTER (INPATIENT)
Facility: HOSPITAL | Age: 78
LOS: 3 days | Discharge: HOME | End: 2018-08-01
Attending: EMERGENCY MEDICINE | Admitting: INTERNAL MEDICINE

## 2018-07-29 DIAGNOSIS — L03.116 CELLULITIS OF LEFT LOWER EXTREMITY: Primary | ICD-10-CM

## 2018-07-29 DIAGNOSIS — N28.9 RENAL INSUFFICIENCY: ICD-10-CM

## 2018-07-29 DIAGNOSIS — Z74.09 IMPAIRED FUNCTIONAL MOBILITY AND ACTIVITY TOLERANCE: ICD-10-CM

## 2018-07-29 PROBLEM — L03.90 CELLULITIS: Status: ACTIVE | Noted: 2018-07-29

## 2018-07-29 LAB
ALBUMIN SERPL-MCNC: 3.85 G/DL (ref 3.2–4.8)
ALBUMIN/GLOB SERPL: 1.3 G/DL (ref 1.5–2.5)
ALP SERPL-CCNC: 64 U/L (ref 25–100)
ALT SERPL W P-5'-P-CCNC: 12 U/L (ref 7–40)
ANION GAP SERPL CALCULATED.3IONS-SCNC: 9 MMOL/L (ref 3–11)
AST SERPL-CCNC: 20 U/L (ref 0–33)
BASOPHILS # BLD AUTO: 0.03 10*3/MM3 (ref 0–0.2)
BASOPHILS NFR BLD AUTO: 0.5 % (ref 0–1)
BILIRUB SERPL-MCNC: 0.2 MG/DL (ref 0.3–1.2)
BUN BLD-MCNC: 23 MG/DL (ref 9–23)
BUN/CREAT SERPL: 19.3 (ref 7–25)
CALCIUM SPEC-SCNC: 8.7 MG/DL (ref 8.7–10.4)
CHLORIDE SERPL-SCNC: 110 MMOL/L (ref 99–109)
CO2 SERPL-SCNC: 22 MMOL/L (ref 20–31)
CREAT BLD-MCNC: 1.19 MG/DL (ref 0.6–1.3)
CRP SERPL-MCNC: 1.24 MG/DL (ref 0–1)
DEPRECATED RDW RBC AUTO: 46.2 FL (ref 37–54)
EOSINOPHIL # BLD AUTO: 0.11 10*3/MM3 (ref 0–0.3)
EOSINOPHIL NFR BLD AUTO: 1.9 % (ref 0–3)
ERYTHROCYTE [DISTWIDTH] IN BLOOD BY AUTOMATED COUNT: 13.4 % (ref 11.3–14.5)
ERYTHROCYTE [SEDIMENTATION RATE] IN BLOOD: 27 MM/HR (ref 0–30)
GFR SERPL CREATININE-BSD FRML MDRD: 44 ML/MIN/1.73
GLOBULIN UR ELPH-MCNC: 3.1 GM/DL
GLUCOSE BLD-MCNC: 99 MG/DL (ref 70–100)
HCT VFR BLD AUTO: 34.6 % (ref 34.5–44)
HGB BLD-MCNC: 11.3 G/DL (ref 11.5–15.5)
HOLD SPECIMEN: NORMAL
HOLD SPECIMEN: NORMAL
IMM GRANULOCYTES # BLD: 0.01 10*3/MM3 (ref 0–0.03)
IMM GRANULOCYTES NFR BLD: 0.2 % (ref 0–0.6)
LYMPHOCYTES # BLD AUTO: 1.85 10*3/MM3 (ref 0.6–4.8)
LYMPHOCYTES NFR BLD AUTO: 32 % (ref 24–44)
MCH RBC QN AUTO: 30.6 PG (ref 27–31)
MCHC RBC AUTO-ENTMCNC: 32.7 G/DL (ref 32–36)
MCV RBC AUTO: 93.8 FL (ref 80–99)
MONOCYTES # BLD AUTO: 0.68 10*3/MM3 (ref 0–1)
MONOCYTES NFR BLD AUTO: 11.7 % (ref 0–12)
NEUTROPHILS # BLD AUTO: 3.12 10*3/MM3 (ref 1.5–8.3)
NEUTROPHILS NFR BLD AUTO: 53.9 % (ref 41–71)
PLATELET # BLD AUTO: 243 10*3/MM3 (ref 150–450)
PMV BLD AUTO: 10.5 FL (ref 6–12)
POTASSIUM BLD-SCNC: 4.4 MMOL/L (ref 3.5–5.5)
PROT SERPL-MCNC: 6.9 G/DL (ref 5.7–8.2)
RBC # BLD AUTO: 3.69 10*6/MM3 (ref 3.89–5.14)
SODIUM BLD-SCNC: 141 MMOL/L (ref 132–146)
WBC NRBC COR # BLD: 5.79 10*3/MM3 (ref 3.5–10.8)
WHOLE BLOOD HOLD SPECIMEN: NORMAL
WHOLE BLOOD HOLD SPECIMEN: NORMAL

## 2018-07-29 PROCEDURE — 86140 C-REACTIVE PROTEIN: CPT | Performed by: EMERGENCY MEDICINE

## 2018-07-29 PROCEDURE — 25010000002 ENOXAPARIN PER 10 MG: Performed by: FAMILY MEDICINE

## 2018-07-29 PROCEDURE — 80053 COMPREHEN METABOLIC PANEL: CPT | Performed by: EMERGENCY MEDICINE

## 2018-07-29 PROCEDURE — 99284 EMERGENCY DEPT VISIT MOD MDM: CPT

## 2018-07-29 PROCEDURE — 85025 COMPLETE CBC W/AUTO DIFF WBC: CPT | Performed by: EMERGENCY MEDICINE

## 2018-07-29 PROCEDURE — 99222 1ST HOSP IP/OBS MODERATE 55: CPT | Performed by: FAMILY MEDICINE

## 2018-07-29 PROCEDURE — 25010000002 DAPTOMYCIN PER 1 MG: Performed by: EMERGENCY MEDICINE

## 2018-07-29 RX ORDER — PREGABALIN 100 MG/1
100 CAPSULE ORAL DAILY
Status: DISCONTINUED | OUTPATIENT
Start: 2018-07-30 | End: 2018-07-30

## 2018-07-29 RX ORDER — LISINOPRIL 20 MG/1
20 TABLET ORAL DAILY
Status: DISCONTINUED | OUTPATIENT
Start: 2018-07-30 | End: 2018-08-01 | Stop reason: HOSPADM

## 2018-07-29 RX ORDER — ATENOLOL 50 MG/1
50 TABLET ORAL DAILY
Status: DISCONTINUED | OUTPATIENT
Start: 2018-07-30 | End: 2018-08-01 | Stop reason: HOSPADM

## 2018-07-29 RX ORDER — DOXYCYCLINE HYCLATE 100 MG/1
100 CAPSULE ORAL 2 TIMES DAILY
COMMUNITY
Start: 2018-07-25 | End: 2018-08-01 | Stop reason: HOSPADM

## 2018-07-29 RX ORDER — LISINOPRIL 20 MG/1
20 TABLET ORAL DAILY
COMMUNITY

## 2018-07-29 RX ORDER — ROPINIROLE 0.5 MG/1
0.25 TABLET, FILM COATED ORAL 3 TIMES DAILY
Status: DISCONTINUED | OUTPATIENT
Start: 2018-07-29 | End: 2018-08-01 | Stop reason: HOSPADM

## 2018-07-29 RX ORDER — ROPINIROLE 0.5 MG/1
0.5 TABLET, FILM COATED ORAL NIGHTLY
COMMUNITY

## 2018-07-29 RX ORDER — SODIUM CHLORIDE 0.9 % (FLUSH) 0.9 %
10 SYRINGE (ML) INJECTION AS NEEDED
Status: DISCONTINUED | OUTPATIENT
Start: 2018-07-29 | End: 2018-08-01 | Stop reason: HOSPADM

## 2018-07-29 RX ADMIN — ENOXAPARIN SODIUM 30 MG: 30 INJECTION SUBCUTANEOUS at 21:17

## 2018-07-29 RX ADMIN — DAPTOMYCIN 450 MG: 500 INJECTION, POWDER, LYOPHILIZED, FOR SOLUTION INTRAVENOUS at 15:54

## 2018-07-29 RX ADMIN — ROPINIROLE 0.25 MG: 0.5 TABLET, FILM COATED ORAL at 21:17

## 2018-07-30 ENCOUNTER — APPOINTMENT (OUTPATIENT)
Dept: GENERAL RADIOLOGY | Facility: HOSPITAL | Age: 78
End: 2018-07-30

## 2018-07-30 ENCOUNTER — APPOINTMENT (OUTPATIENT)
Dept: CT IMAGING | Facility: HOSPITAL | Age: 78
End: 2018-07-30

## 2018-07-30 PROBLEM — G62.9 NEUROPATHY: Status: ACTIVE | Noted: 2018-07-30

## 2018-07-30 PROBLEM — G25.81 RESTLESS LEG: Status: ACTIVE | Noted: 2018-07-30

## 2018-07-30 LAB
ALBUMIN SERPL-MCNC: 3.69 G/DL (ref 3.2–4.8)
ALBUMIN/GLOB SERPL: 1.3 G/DL (ref 1.5–2.5)
ALP SERPL-CCNC: 55 U/L (ref 25–100)
ALT SERPL W P-5'-P-CCNC: 15 U/L (ref 7–40)
ANION GAP SERPL CALCULATED.3IONS-SCNC: 6 MMOL/L (ref 3–11)
AST SERPL-CCNC: 20 U/L (ref 0–33)
BASOPHILS # BLD AUTO: 0.02 10*3/MM3 (ref 0–0.2)
BASOPHILS NFR BLD AUTO: 0.4 % (ref 0–1)
BILIRUB SERPL-MCNC: 0.3 MG/DL (ref 0.3–1.2)
BUN BLD-MCNC: 20 MG/DL (ref 9–23)
BUN/CREAT SERPL: 19 (ref 7–25)
CALCIUM SPEC-SCNC: 8.9 MG/DL (ref 8.7–10.4)
CHLORIDE SERPL-SCNC: 107 MMOL/L (ref 99–109)
CO2 SERPL-SCNC: 27 MMOL/L (ref 20–31)
CREAT BLD-MCNC: 1.05 MG/DL (ref 0.6–1.3)
DEPRECATED RDW RBC AUTO: 46.1 FL (ref 37–54)
EOSINOPHIL # BLD AUTO: 0.11 10*3/MM3 (ref 0–0.3)
EOSINOPHIL NFR BLD AUTO: 2.1 % (ref 0–3)
ERYTHROCYTE [DISTWIDTH] IN BLOOD BY AUTOMATED COUNT: 13.3 % (ref 11.3–14.5)
GFR SERPL CREATININE-BSD FRML MDRD: 50 ML/MIN/1.73
GLOBULIN UR ELPH-MCNC: 2.9 GM/DL
GLUCOSE BLD-MCNC: 85 MG/DL (ref 70–100)
HBA1C MFR BLD: 5.5 % (ref 4.8–5.6)
HCT VFR BLD AUTO: 36.1 % (ref 34.5–44)
HGB BLD-MCNC: 11.7 G/DL (ref 11.5–15.5)
IMM GRANULOCYTES # BLD: 0.01 10*3/MM3 (ref 0–0.03)
IMM GRANULOCYTES NFR BLD: 0.2 % (ref 0–0.6)
LYMPHOCYTES # BLD AUTO: 2 10*3/MM3 (ref 0.6–4.8)
LYMPHOCYTES NFR BLD AUTO: 37.4 % (ref 24–44)
MCH RBC QN AUTO: 30.7 PG (ref 27–31)
MCHC RBC AUTO-ENTMCNC: 32.4 G/DL (ref 32–36)
MCV RBC AUTO: 94.8 FL (ref 80–99)
MONOCYTES # BLD AUTO: 0.49 10*3/MM3 (ref 0–1)
MONOCYTES NFR BLD AUTO: 9.2 % (ref 0–12)
NEUTROPHILS # BLD AUTO: 2.72 10*3/MM3 (ref 1.5–8.3)
NEUTROPHILS NFR BLD AUTO: 50.7 % (ref 41–71)
PLATELET # BLD AUTO: 211 10*3/MM3 (ref 150–450)
PMV BLD AUTO: 10.1 FL (ref 6–12)
POTASSIUM BLD-SCNC: 4.4 MMOL/L (ref 3.5–5.5)
PROT SERPL-MCNC: 6.6 G/DL (ref 5.7–8.2)
RBC # BLD AUTO: 3.81 10*6/MM3 (ref 3.89–5.14)
SODIUM BLD-SCNC: 140 MMOL/L (ref 132–146)
WBC NRBC COR # BLD: 5.35 10*3/MM3 (ref 3.5–10.8)

## 2018-07-30 PROCEDURE — 87186 SC STD MICRODIL/AGAR DIL: CPT | Performed by: INTERNAL MEDICINE

## 2018-07-30 PROCEDURE — 25010000002 IOPAMIDOL 61 % SOLUTION: Performed by: INTERNAL MEDICINE

## 2018-07-30 PROCEDURE — 25010000002 ENOXAPARIN PER 10 MG: Performed by: FAMILY MEDICINE

## 2018-07-30 PROCEDURE — 80053 COMPREHEN METABOLIC PANEL: CPT | Performed by: FAMILY MEDICINE

## 2018-07-30 PROCEDURE — 87147 CULTURE TYPE IMMUNOLOGIC: CPT | Performed by: INTERNAL MEDICINE

## 2018-07-30 PROCEDURE — 87205 SMEAR GRAM STAIN: CPT | Performed by: INTERNAL MEDICINE

## 2018-07-30 PROCEDURE — 85025 COMPLETE CBC W/AUTO DIFF WBC: CPT | Performed by: FAMILY MEDICINE

## 2018-07-30 PROCEDURE — 99232 SBSQ HOSP IP/OBS MODERATE 35: CPT | Performed by: INTERNAL MEDICINE

## 2018-07-30 PROCEDURE — 83036 HEMOGLOBIN GLYCOSYLATED A1C: CPT | Performed by: INTERNAL MEDICINE

## 2018-07-30 PROCEDURE — 73630 X-RAY EXAM OF FOOT: CPT

## 2018-07-30 PROCEDURE — 87077 CULTURE AEROBIC IDENTIFY: CPT | Performed by: INTERNAL MEDICINE

## 2018-07-30 PROCEDURE — 87070 CULTURE OTHR SPECIMN AEROBIC: CPT | Performed by: INTERNAL MEDICINE

## 2018-07-30 PROCEDURE — 25010000002 DAPTOMYCIN PER 1 MG: Performed by: FAMILY MEDICINE

## 2018-07-30 PROCEDURE — 73701 CT LOWER EXTREMITY W/DYE: CPT

## 2018-07-30 RX ORDER — PREGABALIN 100 MG/1
100 CAPSULE ORAL EVERY 12 HOURS SCHEDULED
Status: DISCONTINUED | OUTPATIENT
Start: 2018-07-30 | End: 2018-08-01 | Stop reason: HOSPADM

## 2018-07-30 RX ORDER — HYDROCODONE BITARTRATE AND ACETAMINOPHEN 5; 325 MG/1; MG/1
1 TABLET ORAL EVERY 6 HOURS PRN
Status: DISCONTINUED | OUTPATIENT
Start: 2018-07-30 | End: 2018-08-01 | Stop reason: HOSPADM

## 2018-07-30 RX ORDER — PREGABALIN 100 MG/1
100 CAPSULE ORAL ONCE
Status: COMPLETED | OUTPATIENT
Start: 2018-07-30 | End: 2018-07-30

## 2018-07-30 RX ORDER — TRAMADOL HYDROCHLORIDE 50 MG/1
25 TABLET ORAL 2 TIMES DAILY PRN
Status: DISCONTINUED | OUTPATIENT
Start: 2018-07-30 | End: 2018-08-01 | Stop reason: HOSPADM

## 2018-07-30 RX ORDER — ROPINIROLE 0.5 MG/1
0.5 TABLET, FILM COATED ORAL NIGHTLY
Status: DISCONTINUED | OUTPATIENT
Start: 2018-07-30 | End: 2018-08-01 | Stop reason: HOSPADM

## 2018-07-30 RX ADMIN — TRAMADOL HYDROCHLORIDE 25 MG: 50 TABLET, COATED ORAL at 03:54

## 2018-07-30 RX ADMIN — PREGABALIN 100 MG: 100 CAPSULE ORAL at 21:32

## 2018-07-30 RX ADMIN — LISINOPRIL 20 MG: 20 TABLET ORAL at 09:36

## 2018-07-30 RX ADMIN — ROPINIROLE 0.25 MG: 0.5 TABLET, FILM COATED ORAL at 09:36

## 2018-07-30 RX ADMIN — ROPINIROLE 0.25 MG: 0.5 TABLET, FILM COATED ORAL at 21:32

## 2018-07-30 RX ADMIN — SODIUM CHLORIDE 500 ML: 9 INJECTION, SOLUTION INTRAVENOUS at 15:41

## 2018-07-30 RX ADMIN — IOPAMIDOL 95 ML: 612 INJECTION, SOLUTION INTRAVENOUS at 19:51

## 2018-07-30 RX ADMIN — ROPINIROLE 0.25 MG: 0.5 TABLET, FILM COATED ORAL at 15:41

## 2018-07-30 RX ADMIN — TRAMADOL HYDROCHLORIDE 25 MG: 50 TABLET, COATED ORAL at 09:37

## 2018-07-30 RX ADMIN — ESTROGENS, CONJUGATED 0.62 MG: 0.62 TABLET, FILM COATED ORAL at 09:36

## 2018-07-30 RX ADMIN — HYDROCODONE BITARTRATE AND ACETAMINOPHEN 1 TABLET: 5; 325 TABLET ORAL at 21:32

## 2018-07-30 RX ADMIN — DAPTOMYCIN 450 MG: 500 INJECTION, POWDER, LYOPHILIZED, FOR SOLUTION INTRAVENOUS at 15:41

## 2018-07-30 RX ADMIN — PREGABALIN 100 MG: 100 CAPSULE ORAL at 09:38

## 2018-07-30 RX ADMIN — PREGABALIN 100 MG: 100 CAPSULE ORAL at 02:50

## 2018-07-30 RX ADMIN — ATENOLOL 50 MG: 50 TABLET ORAL at 09:37

## 2018-07-30 RX ADMIN — ENOXAPARIN SODIUM 30 MG: 30 INJECTION SUBCUTANEOUS at 21:31

## 2018-07-31 LAB — CK SERPL-CCNC: 84 U/L (ref 26–174)

## 2018-07-31 PROCEDURE — 25010000002 DAPTOMYCIN PER 1 MG: Performed by: FAMILY MEDICINE

## 2018-07-31 PROCEDURE — 99232 SBSQ HOSP IP/OBS MODERATE 35: CPT | Performed by: INTERNAL MEDICINE

## 2018-07-31 PROCEDURE — 82550 ASSAY OF CK (CPK): CPT

## 2018-07-31 PROCEDURE — 25010000002 ENOXAPARIN PER 10 MG

## 2018-07-31 RX ORDER — CYCLOBENZAPRINE HCL 10 MG
5 TABLET ORAL 3 TIMES DAILY PRN
Status: DISCONTINUED | OUTPATIENT
Start: 2018-07-31 | End: 2018-08-01 | Stop reason: HOSPADM

## 2018-07-31 RX ADMIN — ESTROGENS, CONJUGATED 0.62 MG: 0.62 TABLET, FILM COATED ORAL at 08:54

## 2018-07-31 RX ADMIN — ROPINIROLE 0.25 MG: 0.5 TABLET, FILM COATED ORAL at 15:48

## 2018-07-31 RX ADMIN — ENOXAPARIN SODIUM 40 MG: 100 INJECTION SUBCUTANEOUS at 20:32

## 2018-07-31 RX ADMIN — DAPTOMYCIN 450 MG: 500 INJECTION, POWDER, LYOPHILIZED, FOR SOLUTION INTRAVENOUS at 15:50

## 2018-07-31 RX ADMIN — HYDROCODONE BITARTRATE AND ACETAMINOPHEN 1 TABLET: 5; 325 TABLET ORAL at 11:46

## 2018-07-31 RX ADMIN — ATENOLOL 50 MG: 50 TABLET ORAL at 08:54

## 2018-07-31 RX ADMIN — PREGABALIN 100 MG: 100 CAPSULE ORAL at 08:54

## 2018-07-31 RX ADMIN — ROPINIROLE HYDROCHLORIDE 0.5 MG: 0.5 TABLET, FILM COATED ORAL at 20:31

## 2018-07-31 RX ADMIN — ROPINIROLE 0.25 MG: 0.5 TABLET, FILM COATED ORAL at 20:32

## 2018-07-31 RX ADMIN — ROPINIROLE HYDROCHLORIDE 0.5 MG: 0.5 TABLET, FILM COATED ORAL at 00:36

## 2018-07-31 RX ADMIN — ROPINIROLE 0.25 MG: 0.5 TABLET, FILM COATED ORAL at 08:54

## 2018-07-31 RX ADMIN — TRAMADOL HYDROCHLORIDE 25 MG: 50 TABLET, COATED ORAL at 15:48

## 2018-07-31 RX ADMIN — PREGABALIN 100 MG: 100 CAPSULE ORAL at 20:32

## 2018-07-31 RX ADMIN — CYCLOBENZAPRINE HYDROCHLORIDE 5 MG: 10 TABLET, FILM COATED ORAL at 14:03

## 2018-07-31 RX ADMIN — TRAMADOL HYDROCHLORIDE 25 MG: 50 TABLET, COATED ORAL at 05:08

## 2018-07-31 RX ADMIN — LISINOPRIL 20 MG: 20 TABLET ORAL at 08:54

## 2018-08-01 VITALS
BODY MASS INDEX: 26.66 KG/M2 | OXYGEN SATURATION: 96 % | SYSTOLIC BLOOD PRESSURE: 130 MMHG | HEART RATE: 96 BPM | DIASTOLIC BLOOD PRESSURE: 72 MMHG | RESPIRATION RATE: 16 BRPM | WEIGHT: 160 LBS | TEMPERATURE: 97.7 F | HEIGHT: 65 IN

## 2018-08-01 PROCEDURE — 97116 GAIT TRAINING THERAPY: CPT

## 2018-08-01 PROCEDURE — 25010000002 DAPTOMYCIN PER 1 MG: Performed by: FAMILY MEDICINE

## 2018-08-01 PROCEDURE — 97110 THERAPEUTIC EXERCISES: CPT

## 2018-08-01 PROCEDURE — 99239 HOSP IP/OBS DSCHRG MGMT >30: CPT | Performed by: NURSE PRACTITIONER

## 2018-08-01 PROCEDURE — 97162 PT EVAL MOD COMPLEX 30 MIN: CPT

## 2018-08-01 RX ORDER — LINEZOLID 600 MG/1
600 TABLET, FILM COATED ORAL EVERY 12 HOURS SCHEDULED
Qty: 20 TABLET | Refills: 0 | Status: SHIPPED | OUTPATIENT
Start: 2018-08-02 | End: 2018-08-12

## 2018-08-01 RX ADMIN — LISINOPRIL 20 MG: 20 TABLET ORAL at 08:59

## 2018-08-01 RX ADMIN — DAPTOMYCIN 450 MG: 500 INJECTION, POWDER, LYOPHILIZED, FOR SOLUTION INTRAVENOUS at 11:45

## 2018-08-01 RX ADMIN — CYCLOBENZAPRINE HYDROCHLORIDE 5 MG: 10 TABLET, FILM COATED ORAL at 02:55

## 2018-08-01 RX ADMIN — ATENOLOL 50 MG: 50 TABLET ORAL at 08:59

## 2018-08-01 RX ADMIN — ESTROGENS, CONJUGATED 0.62 MG: 0.62 TABLET, FILM COATED ORAL at 08:59

## 2018-08-01 RX ADMIN — ROPINIROLE 0.25 MG: 0.5 TABLET, FILM COATED ORAL at 08:59

## 2018-08-01 RX ADMIN — HYDROCODONE BITARTRATE AND ACETAMINOPHEN 1 TABLET: 5; 325 TABLET ORAL at 02:55

## 2018-08-01 RX ADMIN — PREGABALIN 100 MG: 100 CAPSULE ORAL at 08:59

## 2018-08-01 RX ADMIN — HYDROCODONE BITARTRATE AND ACETAMINOPHEN 1 TABLET: 5; 325 TABLET ORAL at 08:59

## 2018-08-01 NOTE — PLAN OF CARE
Problem: Patient Care Overview  Goal: Plan of Care Review  Outcome: Ongoing (interventions implemented as appropriate)   08/01/18 0258   Coping/Psychosocial   Plan of Care Reviewed With patient   Plan of Care Review   Progress no change   OTHER   Outcome Summary Pt resting well overnight, given pain pill and flexeril for leg pain this am, amb with assistance to bathroom.

## 2018-08-01 NOTE — DISCHARGE SUMMARY
[facility] Medicine Services  DISCHARGE SUMMARY    Patient Name: Latoya Molina  : 1940  MRN: [id number]    Date of Admission: 2018  Date of Discharge:  2018  Primary Care Physician: Chava Beltran MD    Consults     No orders found from 2018 to 2018.        Hospital Course     Presenting Problem:   Cellulitis [L03.90]    Active Hospital Problems    Diagnosis Date Noted   • **Left 2nd toe Cellulitis and wound [L03.90] 2018   • Restless leg [G25.81] 2018   • Neuropathy [G62.9] 2018   • Essential hypertension [I10] 10/30/2017      Resolved Hospital Problems    Diagnosis Date Noted Date Resolved   No resolved problems to display.     Hospital Course:  Latoya Molina is a 78 y.o. female with history of peripheral neuropathy, right nephrectomy, right toe MS as a cellulitis in 10/2017, bilateral knee replacements, and spinal cord stimulator placement.  She presented to ER with right toe pain and found to have cellulitis in which she failed outpatient antibiotic with doxycycline.  She was admitted to the hospital medicine service for further evaluation and management.  Plain film and CT was negative for osteomyelitis.  Unable to obtain MRI due to spinal cord stimulator.  Culture obtained on 18 from toe drainage showed scant growth of Staphylococcus aureus.  Case was discussed with Dr. Restrepo via phone and since no evidence of osteomyelitis plan was medical management and avoid amputation if possible.  Infectious disease was consulted and patient has received 3 doses of IV daptomycin.  She will be discharged home on Zyvox 600 mg by mouth twice a day ×10 days with close follow-up with Dr. Meredith.  Patient is medically stable and ready for discharge home today.  Discharge plans and instructions were reviewed with patient and she verbalized understanding.  Patient to follow-up with Dr. Meredith tomorrow, 18.  She will follow-up with  her PCP within 1 week or sooner if needed.        Day of Discharge     HPI:   Patient resting in bed without any new complaints or issues.  States she feels well and is ready to go home.  Denies chest pain, shortness breath, or abdominal pain.  Tolerating by mouth diet without nausea or vomiting.  Having BMs.  No fever or chills.    Review of Systems  Gen- No fevers, chills  CV- No chest pain, palpitations  Resp- No cough, dyspnea  GI- No N/V/D, abd pain    Otherwise ROS is negative except as mentioned in the HPI.    Vital Signs:   Temp:  [97.6 °F (36.4 °C)-98.3 °F (36.8 °C)] 97.6 °F (36.4 °C)  Heart Rate:  [57-96] 96  Resp:  [16-18] 16  BP: (147-163)/(64-78) 149/78     Physical Exam:  Constitutional: No acute distress, awake, alert  HENT: NCAT, mucous membranes moist  Respiratory: Clear to auscultation bilaterally, respiratory effort normal   Cardiovascular: RRR, no murmurs, rubs, or gallops, palpable pedal pulses bilaterally  Gastrointestinal: Positive bowel sounds, soft, nontender, nondistended  Musculoskeletal: No bilateral ankle edema,left foot 2nd toe, with erythema and drainage-improved   Psychiatric: Appropriate affect, cooperative  Neurologic: Oriented x 3, strength symmetric in all extremities, Cranial Nerves grossly intact to confrontation, speech clear  Skin: No rashes    Pertinent  and/or Most Recent Results       Results from last 7 days  Lab Units 07/30/18  0722 07/29/18  1407   WBC 10*3/mm3 5.35 5.79   HEMOGLOBIN g/dL 11.7 11.3*   HEMATOCRIT % 36.1 34.6   PLATELETS 10*3/mm3 211 243   SODIUM mmol/L 140 141   POTASSIUM mmol/L 4.4 4.4   CHLORIDE mmol/L 107 110*   CO2 mmol/L 27.0 22.0   BUN mg/dL 20 23   CREATININE mg/dL 1.05 1.19   GLUCOSE mg/dL 85 99   CALCIUM mg/dL 8.9 8.7       Results from last 7 days  Lab Units 07/30/18  0722 07/29/18  1407   BILIRUBIN mg/dL 0.3 0.2*   ALK PHOS U/L 55 64   ALT (SGPT) U/L 15 12   AST (SGOT) U/L 20 20       Results from last 7 days  Lab Units 07/30/18  4718    HEMOGLOBIN A1C % 5.50     Brief Urine Lab Results  (Last result in the past 365 days)      Color   Clarity   Blood   Leuk Est   Nitrite   Protein   CREAT   Urine HCG        10/27/17 2204 Chiqui(A) Clear Negative Negative Negative Negative             Microbiology Results Abnormal     Procedure Component Value - Date/Time    Wound Culture - Drainage, Toe, Left [[id number]]  (Abnormal) Collected:  07/30/18 1239    Lab Status:  Preliminary result Specimen:  Drainage from Toe, Left Updated:  08/01/18 1114     Wound Culture Scant growth (1+) Staphylococcus aureus (A)     Gram Stain Result No WBCs or organisms seen        Imaging Results (all)     Procedure Component Value Units Date/Time    CT Lower Extremity Left With Contrast [[id number]] Collected:  07/31/18 1038     Updated:  07/31/18 1038    Narrative:       EXAMINATION: CT SCAN OF THE LEFT FOOT WITHOUT CONTRAST WITH 2-D  RECONSTRUCTIONS     History: Left second toe infection, possible osteomyelitis.       TECHNICAL: Exam consists of spiral acquisition 2 mm axial images of the  left foot with sagittal and coronal thin section 2-D reconstructions.     COMPARISON: Left foot plain films 07/30/2018     FINDINGS: Plain film from earlier same date shows advanced midfoot DJD  typical of Charcot arthropathy, and flattening of the plantar arch.  There is soft tissue swelling of the distal second digit, but no obvious  underlying bony erosion.     Today's study shows a small amount of air in the superficial soft  tissues at the tip of the second digit presumably within a wound/ulcer.  The underlying bony structures appear intact. No periosteal reaction or  keely bone destruction is seen. Bony structures elsewhere appear intact.  Advanced midfoot DJD is again noted but no keely bony erosion is  identified.       Impression:       1. Findings consistent with soft tissue inflammation of the tip of the  second digit. No evidence of underlying osteomyelitis here or elsewhere.  2.  Advanced midfoot DJD presumably Charcot arthropathy.     D:  07/31/2018  E:  07/31/2018       XR Foot 3+ View Left [[id number]] Collected:  07/30/18 1441     Updated:  07/30/18 1643    Narrative:       EXAMINATION: XR FOOT 3+ VW LEFT- 07/30/2018     INDICATION: 2nd toe infection; L03.116-Cellulitis of left lower limb;  N28.9-Disorder of kidney and ureter, unspecified      COMPARISON: NONE     FINDINGS: There is severe degenerative changes involving the  tarsometatarsal articulations. There are also posttraumatic changes.  There is no acute fracture. There is soft tissue swelling involving the  left second digit. There are, however, no bony erosive changes, foreign  body or soft tissue air.           Impression:       There is no evidence of osteomyelitis.     D:  07/30/2018  E:  07/30/2018     This report was finalized on 7/30/2018 4:41 PM by Dr. Randy Sosa MD.              Order Current Status    Wound Culture - Drainage, Toe, Left Preliminary result        Discharge Details        Discharge Medications      New Medications      Instructions Start Date   linezolid 600 MG tablet  Commonly known as:  ZYVOX   600 mg, Oral, Every 12 Hours Scheduled         Continue These Medications      Instructions Start Date   acetaminophen 650 MG 8 hr tablet  Commonly known as:  TYLENOL   650 mg, Oral, Every 8 Hours PRN      atenolol 50 MG tablet  Commonly known as:  TENORMIN   50 mg, Oral, Daily      estrogens (conjugated) 0.625 MG tablet  Commonly known as:  PREMARIN   0.625 mg, Oral, Daily, Take daily for 21 days then do not take for 7 days.       lisinopril 20 MG tablet  Commonly known as:  PRINIVIL,ZESTRIL   20 mg, Oral, Daily      pregabalin 100 MG capsule  Commonly known as:  LYRICA   100 mg, Oral, 2 Times Daily      rOPINIRole 0.5 MG tablet  Commonly known as:  REQUIP   0.5 mg, Oral, Nightly, Take 1 hour before bedtime.       rOPINIRole 0.25 MG tablet  Commonly known as:  REQUIP   0.25 mg, Oral, 3 Times Daily         Stop  These Medications    doxycycline 100 MG capsule  Commonly known as:  VIBRAMYCIN          Discharge Disposition:  Home or Self Care    Discharge Diet:  Diet Instructions     Diet: Regular, Consistent Carbohydrate       Discharge Diet:   Regular  Consistent Carbohydrate           Discharge Activity:   Activity Instructions     Activity as Tolerated       Patient instructed to wear close toed shoes.        Code Status/Level of Support:  Code Status and Medical Interventions:   Ordered at: 07/30/18 1336     Level Of Support Discussed With:    Patient     Code Status:    CPR     Medical Interventions (Level of Support Prior to Arrest):    Full     Future Appointments  Date Time Provider Department Center   10/29/2018 1:15 PM [facility]  [facility]       Additional Instructions for the Follow-ups that You Need to Schedule     Discharge Follow-up with PCP    As directed      Currently Documented PCP:  Chava Beltran MD  PCP Phone Number:  [phone]    Follow Up Details:  1 week for hospital follow up         Discharge Follow-up with Specified Provider: Dr. Meredith    As directed      To:  Dr. Meredith    Follow Up Details:  8/2/2018--office to call patient with appointment               Time Spent on Discharge:  40 minutes    Electronically signed by JOSUE Graham, 08/01/18, 11:20 AM.

## 2018-08-01 NOTE — PLAN OF CARE
Problem: Patient Care Overview  Goal: Plan of Care Review  Outcome: Ongoing (interventions implemented as appropriate)   08/01/18 1102   Coping/Psychosocial   Plan of Care Reviewed With patient   Plan of Care Review   Progress improving   OTHER   Outcome Summary Pt is ambulating with R walker. Precautions and exercises reviewed with pt. adequate pain control. Pt requested information/instructions on wound care.

## 2018-08-01 NOTE — CONSULTS
Latoya Molina  1940  [id number]    Date of Consult: 8/1/2018 7/29/2018      Requesting Provider: Luis Wise MD  Evaluating Physician: Farhan Meredith MD    Chief Complaint: left 2nd toe pain and swelling    Reason for Consultation: left 2nd toe cellulitis    History of present illness:    Patient is a 78 y.o.  Yr old female with history of peripheral neuropathy, h/o right nephrectomy,  h/o right 2nd toe MSSA cellulitis in 10/2017, h/o bilateral knee replacements, and s/p spinal stimulator placement who presented to the ER for worsening left 2nd toe pain x4 days. The patient started having pain and redness over her distal left 2nd toe and developed swelling and some purulent drainage started about 4 days prior to admission. She initially presented to her PCP and wound cx were done which apparently results with MRSA (patient report). She apparently had an X-ray of the toe performed which was negative without evidence of a foreign body per ER physician report (patient states this may not have been done but is unclear about this). She was given Doxycycline over the last 4 days and had worsening pain, swelling, and erythema extended to foot so she presented to the ER. Additionally, the distal tip of her toe has become whitish over the last 1-2 days. A She denies having any f/c/ns recently.    She has remained afebrile and HD stable with WBC 5. CRP slightly elevated but ESR normal. She has been started on Daptomycin.Orthopedic surgery has been consulted. ID consulted for abx recs.     7/31/18:  Patient continues to have toe pain and swelling although maybe improved somewhat. No f/c/ns. NO myalgias. No new rash. No diarrhea. Tolerating abx well.    Allergies:  Sulfa- JUWAN    Medication:  Current Facility-Administered Medications   Medication Dose Route Frequency Provider Last Rate Last Dose   • atenolol (TENORMIN) tablet 50 mg  50 mg Oral Daily Fernando Jensen DO   50 mg at 07/31/18 0855   • cyclobenzaprine  (FLEXERIL) tablet 5 mg  5 mg Oral TID PRN Luis Wise MD   5 mg at 07/31/18 1403   • DAPTOmycin (CUBICIN) 450 mg in sodium chloride 0.9 % 50 mL IVPB  6 mg/kg Intravenous Q24H Fernando Jensen  mL/hr at 07/31/18 1550 450 mg at 07/31/18 1550   • enoxaparin (LOVENOX) syringe 40 mg  40 mg Subcutaneous Q24H Kiki Jeff [facility]   40 mg at 07/31/18 2032   • estrogens (conjugated) (PREMARIN) tablet 0.625 mg  0.625 mg Oral Daily Fernando Jensen DO   0.625 mg at 07/31/18 0854   • HYDROcodone-acetaminophen (NORCO) 5-325 MG per tablet 1 tablet  1 tablet Oral Q6H PRN Luis Wise MD   1 tablet at 07/31/18 1146   • lisinopril (PRINIVIL,ZESTRIL) tablet 20 mg  20 mg Oral Daily Fernando Jensen DO   20 mg at 07/31/18 0854   • pregabalin (LYRICA) capsule 100 mg  100 mg Oral Q12H Luis Wise MD   100 mg at 07/31/18 2032   • rOPINIRole (REQUIP) tablet 0.25 mg  0.25 mg Oral TID Fernando Jensen DO   0.25 mg at 07/31/18 2032   • rOPINIRole (REQUIP) tablet 0.5 mg  0.5 mg Oral Nightly Luis Wise MD   0.5 mg at 07/31/18 2031   • sodium chloride 0.9 % flush 10 mL  10 mL Intravenous PRN Andrew Todd MD       • traMADol (ULTRAM) tablet 25 mg  25 mg Oral BID PRN JOSUE Abbasi   25 mg at 07/31/18 1548         Antibiotics:  Anti-Infectives     Ordered     Dose/Rate Route Frequency Start Stop    07/29/18 1806  DAPTOmycin (CUBICIN) 450 mg in sodium chloride 0.9 % 50 mL IVPB     Ordering Provider:  Fernando Jensen DO    6 mg/kg × 72.6 kg  100 mL/hr over 30 Minutes Intravenous Every 24 Hours 07/30/18 1600 08/04/18 1559    07/29/18 1537  DAPTOmycin (CUBICIN) 450 mg in sodium chloride 0.9 % 50 mL IVPB     Ordering Provider:  Andrew Todd MD    6 mg/kg × 72.6 kg  100 mL/hr over 30 Minutes Intravenous Once 07/29/18 1550 07/29/18 1632            Review of Systems  Per hPI      Physical Exam:   Vital Signs   /68 (BP Location: Left arm, Patient Position: Sitting)   Pulse 96   Temp 98.3 °F (36.8 °C) (Oral)    "Resp 18   Ht 165.1 cm (65\")   Wt 72.6 kg (160 lb)   SpO2 96%   BMI 26.63 kg/m²     GENERAL: Awake and alert, in no acute distress.   HEENT: Normocephalic, atraumatic. No conjunctival injection. No icterus. Oropharynx clear without evidence of thrush or exudate.   HEART: RRR; No murmur, rubs, gallops. 2+ DP pulses bilaterally  LUNGS: Clear to auscultation bilaterally without wheezing, rales, rhonchi. Normal respiratory effort. Nonlabored.   ABDOMEN: Soft, nontender, nondistended. Positive bowel sounds. No rebound or guarding. No mass or HSM.  EXT:  No cyanosis, clubbing or edema. No cord.  : Without [place] catheter.  MSK: FROM without joint effusions noted arms/legs.    SKIN: left 2nd toe with erythema, induration, and mild tenderness extending up toe. Able to flex and extend toe without significant pain. No purulent fluid expressed from toe today. Distal tip of  Left 2nd toe is pale/whitish. No significant erythema or induration of left foot. No other rashes or skin lesions noted.  NEURO: Oriented to PPT. Decreased sensation over bilateral feet  PSYCHIATRIC: Normal insight and judgement. Cooperative with PE    Laboratory Data      Results from last 7 days  Lab Units 07/30/18  0722 07/29/18  1407   WBC 10*3/mm3 5.35 5.79   HEMOGLOBIN g/dL 11.7 11.3*   HEMATOCRIT % 36.1 34.6   PLATELETS 10*3/mm3 211 243       Results from last 7 days  Lab Units 07/30/18  0722   SODIUM mmol/L 140   POTASSIUM mmol/L 4.4   CHLORIDE mmol/L 107   CO2 mmol/L 27.0   BUN mg/dL 20   CREATININE mg/dL 1.05   GLUCOSE mg/dL 85   CALCIUM mg/dL 8.9       Results from last 7 days  Lab Units 07/30/18  0722   ALK PHOS U/L 55   BILIRUBIN mg/dL 0.3   ALT (SGPT) U/L 15   AST (SGOT) U/L 20       Results from last 7 days  Lab Units 07/29/18  1407   SED RATE mm/hr 27       Results from last 7 days  Lab Units 07/29/18  1407   CRP mg/dL 1.24*       Estimated Creatinine Clearance: 42.6 mL/min (by C-G formula based on SCr of 1.05 mg/dL).   "     Microbiology:      wound cx: scant growth normal skin kathi     Radiology:  Xr Foot 3+ View Left    Result Date: 7/30/2018  There is no evidence of osteomyelitis.  D:  07/30/2018 E:  07/30/2018  This report was finalized on 7/30/2018 4:41 PM by Dr. Randy Sosa MD.      Ct Lower Extremity Left With Contrast    Result Date: 7/31/2018  1. Findings consistent with soft tissue inflammation of the tip of the second digit. No evidence of underlying osteomyelitis here or elsewhere. 2. Advanced midfoot DJD presumably Charcot arthropathy.  D:  07/31/2018 E:  07/31/2018           Impression:   Problem #1 cellulitis of left 2nd toe- purulent and report of MRSA at outside clinic so will need to cover for this. CT scan without signs of osteomyelitis and Dr. Restrepo and Ramana are out of town currently. Unable to get MRI due to spinal stimulator. Inflammatory markers are only mildly elevated which is encouraging for no osteomyelitis. Will plan for 1 more dose of IV antibiotics tomorrow and then will transition to Zyvox for 10 day course with close follow up.    Problem#2: peripheral neuropathy- this complicates her situation because she is at risk for further foot/toe wounds and infections. I have asked the patient to wear close toed shoes and to chest feet regularly. A1C only 5.5.    Problem#3: spinal stimulator in place/Bilateral knee replacements: this complicates the situation because if she were to become septicemic then she could seed her hardware.    Problem#4: antibiotic allergies/adverse rxn: complicates the situation because it limits antibiotic options somewhat (although not a true allergy because she had JUWAN while on Bactrim in the past)    Problem#5: CKD vs. Elevated Cr in setting of right nephrectomy: seems to be at baseline. Complicates the situation a bit because we will need to carefully given abx and monitor for renal toxicity. Will continue Daptomycin over Vancomycin for now given this issue.     PLAN: Thank  you for asking us to see Latoya Molina, I recommend the followin. Continue Daptomycin for 1 more dose in AM then will plan for transition to Zyvox 600mg PO BID for an additional 10 day course.  2. Culture and GS from purulent fluid expressed from wound (reportedly MRSA from outside clinic)  3. CT scan negative for osteomyelitis  4. I have requested a follow up appointment with me on 18. Office will call patient with appointment time.  5. Case mgmt consulted for Zyvox approval.  6. Ok for discharge in AM after Daptomcyin dose if Zyvox approved unless foot is looking worse    High risk if she became septicemia she could seed her bilateral knee replacements or spinal stimulator hardware.I discussed potential risks and benefits of the prescribed antibiotics that include, but are not limited to, solid organ toxicity, muscle pains/breakdown, hepatotoxicity, CDiff, cytopenias, hypersensitivity, Pulm toxicity, serotonin syndrome, etc.. Patient voices understanding and agree to proceed. I have discussed with patient's family today.    UM/MAYELA:  Case mgmt consulted for zyvox prior authorization and arrange for oral abx. AOP follow up appointment scheduled.    Farhan Meredith MD  2018

## 2018-08-01 NOTE — PLAN OF CARE
Problem: Patient Care Overview  Goal: Plan of Care Review  Outcome: Ongoing (interventions implemented as appropriate)    Goal: Individualization and Mutuality  Outcome: Ongoing (interventions implemented as appropriate)    Goal: Discharge Needs Assessment  Outcome: Ongoing (interventions implemented as appropriate)    Goal: Interprofessional Rounds/Family Conf  Outcome: Ongoing (interventions implemented as appropriate)      Problem: Infection, Risk/Actual (Adult)  Goal: Identify Related Risk Factors and Signs and Symptoms  Outcome: Ongoing (interventions implemented as appropriate)    Goal: Infection Prevention/Resolution  Outcome: Ongoing (interventions implemented as appropriate)

## 2018-08-01 NOTE — PROGRESS NOTES
Latoya Molina  1940  [id number]    Date of Consult: 8/1/2018 7/29/2018      Requesting Provider: Luis Wise MD  Evaluating Physician: Farhan Meredith MD    Chief Complaint: left 2nd toe pain and swelling    Reason for Consultation: left 2nd toe cellulitis    History of present illness:    Patient is a 78 y.o.  Yr old female with history of peripheral neuropathy, h/o right nephrectomy,  h/o right 2nd toe MSSA cellulitis in 10/2017, h/o bilateral knee replacements, and s/p spinal stimulator placement who presented to the ER for worsening left 2nd toe pain x4 days. The patient started having pain and redness over her distal left 2nd toe and developed swelling and some purulent drainage started about 4 days prior to admission. She initially presented to her PCP and wound cx were done which apparently results with MRSA (patient report). She apparently had an X-ray of the toe performed which was negative without evidence of a foreign body per ER physician report (patient states this may not have been done but is unclear about this). She was given Doxycycline over the last 4 days and had worsening pain, swelling, and erythema extended to foot so she presented to the ER. Additionally, the distal tip of her toe has become whitish over the last 1-2 days. A She denies having any f/c/ns recently.    She has remained afebrile and HD stable with WBC 5. CRP slightly elevated but ESR normal. She has been started on Daptomycin.Orthopedic surgery has been consulted. ID consulted for abx recs.     7/31/18:  Patient continues to have toe pain and swelling although maybe improved somewhat. No f/c/ns. NO myalgias. No new rash. No diarrhea. Tolerating abx well.    8/1/18:  Patient doing ok today. Still with some toe swelling but no worse. Drainage has improved. Having left leg cramp this morning but no diffuse myalgias. No f/c/ns. No diarrhea, nausea, or vomiting. Tolerating abx well.    Allergies:  Sulfa-  JUWAN    Medication:  Current Facility-Administered Medications   Medication Dose Route Frequency Provider Last Rate Last Dose   • atenolol (TENORMIN) tablet 50 mg  50 mg Oral Daily Fernando Jensen DO   50 mg at 08/01/18 0859   • cyclobenzaprine (FLEXERIL) tablet 5 mg  5 mg Oral TID PRN Luis Wise MD   5 mg at 08/01/18 0255   • DAPTOmycin (CUBICIN) 450 mg in sodium chloride 0.9 % 50 mL IVPB  6 mg/kg Intravenous Q24H Fernando Jensen  mL/hr at 07/31/18 1550 450 mg at 07/31/18 1550   • enoxaparin (LOVENOX) syringe 40 mg  40 mg Subcutaneous Q24H Kiki Jeff [facility]   40 mg at 07/31/18 2032   • estrogens (conjugated) (PREMARIN) tablet 0.625 mg  0.625 mg Oral Daily Fernando Jensen DO   0.625 mg at 08/01/18 0859   • HYDROcodone-acetaminophen (NORCO) 5-325 MG per tablet 1 tablet  1 tablet Oral Q6H PRN Luis Wise MD   1 tablet at 08/01/18 0859   • lisinopril (PRINIVIL,ZESTRIL) tablet 20 mg  20 mg Oral Daily Fernando Jensen DO   20 mg at 08/01/18 0859   • pregabalin (LYRICA) capsule 100 mg  100 mg Oral Q12H Luis Wise MD   100 mg at 08/01/18 0859   • rOPINIRole (REQUIP) tablet 0.25 mg  0.25 mg Oral TID Fernando Jensen DO   0.25 mg at 08/01/18 0859   • rOPINIRole (REQUIP) tablet 0.5 mg  0.5 mg Oral Nightly Luis Wise MD   0.5 mg at 07/31/18 2031   • sodium chloride 0.9 % flush 10 mL  10 mL Intravenous PRN Andrew Todd MD       • traMADol (ULTRAM) tablet 25 mg  25 mg Oral BID PRN JOSUE Abbasi   25 mg at 07/31/18 1548         Antibiotics:  Anti-Infectives     Ordered     Dose/Rate Route Frequency Start Stop    07/29/18 1806  DAPTOmycin (CUBICIN) 450 mg in sodium chloride 0.9 % 50 mL IVPB     Ordering Provider:  Fernando Jensen DO    6 mg/kg × 72.6 kg  100 mL/hr over 30 Minutes Intravenous Every 24 Hours 07/30/18 1600 08/04/18 1559    07/29/18 1537  DAPTOmycin (CUBICIN) 450 mg in sodium chloride 0.9 % 50 mL IVPB     Ordering Provider:  Andrew Todd MD    6 mg/kg × 72.6 kg  100 mL/hr  "over 30 Minutes Intravenous Once 07/29/18 1550 07/29/18 1632            Review of Systems  Per hPI      Physical Exam:   Vital Signs   /78 (BP Location: Left arm, Patient Position: Lying)   Pulse 96   Temp 97.6 °F (36.4 °C) (Oral)   Resp 16   Ht 165.1 cm (65\")   Wt 72.6 kg (160 lb)   SpO2 96%   BMI 26.63 kg/m²     GENERAL: Awake and alert, in no acute distress.   HEENT: Normocephalic, atraumatic. No conjunctival injection. No icterus.   HEART: RRR; No murmur, rubs, gallops. 2+ DP pulses bilaterally  LUNGS: Clear to auscultation bilaterally without wheezing, rales, rhonchi. Normal respiratory effort. Nonlabored.   ABDOMEN: Soft, nontender, nondistended. Positive bowel sounds. No rebound or guarding.  EXT:  No cyanosis, clubbing or edema. No cord.  : Without Nicolas catheter.  MSK: FROM without joint effusions noted arms/legs.    SKIN: left 2nd toe with erythema, induration, and mild tenderness near distal edge of toe. Able to flex and extend toe without significant pain. No purulent fluid expressed from toe today. Nail seems to be  from toe. Distal tip of  Left 2nd toe is pale/whitish. No significant erythema or induration of left foot. No other rashes or skin lesions noted.  NEURO: Oriented to PPT. Decreased sensation over bilateral feet  PSYCHIATRIC: Normal insight and judgement. Cooperative with PE    Laboratory Data      Results from last 7 days  Lab Units 07/30/18  0722 07/29/18  1407   WBC 10*3/mm3 5.35 5.79   HEMOGLOBIN g/dL 11.7 11.3*   HEMATOCRIT % 36.1 34.6   PLATELETS 10*3/mm3 211 243       Results from last 7 days  Lab Units 07/30/18  0722   SODIUM mmol/L 140   POTASSIUM mmol/L 4.4   CHLORIDE mmol/L 107   CO2 mmol/L 27.0   BUN mg/dL 20   CREATININE mg/dL 1.05   GLUCOSE mg/dL 85   CALCIUM mg/dL 8.9       Results from last 7 days  Lab Units 07/30/18  0722   ALK PHOS U/L 55   BILIRUBIN mg/dL 0.3   ALT (SGPT) U/L 15   AST (SGOT) U/L 20       Results from last 7 days  Lab Units " 07/29/18  1407   SED RATE mm/hr 27       Results from last 7 days  Lab Units 07/29/18  1407   CRP mg/dL 1.24*       Estimated Creatinine Clearance: 42.6 mL/min (by C-G formula based on SCr of 1.05 mg/dL).       Microbiology:      wound cx: scant growth normal skin kathi     Radiology:  Xr Foot 3+ View Left    Result Date: 7/30/2018  There is no evidence of osteomyelitis.  D:  07/30/2018 E:  07/30/2018  This report was finalized on 7/30/2018 4:41 PM by Dr. Randy Sosa MD.      Ct Lower Extremity Left With Contrast    Result Date: 7/31/2018  1. Findings consistent with soft tissue inflammation of the tip of the second digit. No evidence of underlying osteomyelitis here or elsewhere. 2. Advanced midfoot DJD presumably Charcot arthropathy.  D:  07/31/2018 E:  07/31/2018           Impression:   Problem #1 cellulitis of left 2nd toe- purulent and report of MRSA at outside clinic so will need to cover for this. CT scan without signs of osteomyelitis and Dr. Restrepo and Ramana are out of town currently. Unable to get MRI due to spinal stimulator. Inflammatory markers are only mildly elevated which is encouraging for no osteomyelitis. Will plan for 1 more dose of IV antibiotics today and then will transition to Zyvox for 10 day course with close follow up. Patient will need to follow up with podiatry or orthopedic surgery on outpatient basis.    Problem#2: peripheral neuropathy- this complicates her situation because she is at risk for further foot/toe wounds and infections. I have asked the patient to wear close toed shoes and to chest feet regularly. A1C only 5.5.    Problem#3: spinal stimulator in place/Bilateral knee replacements: this complicates the situation because if she were to become septicemic then she could seed her hardware.    Problem#4: antibiotic allergies/adverse rxn: complicates the situation because it limits antibiotic options somewhat (although not a true allergy because she had JUWAN while on Bactrim in  the past)    Problem#5: CKD vs. Elevated Cr in setting of right nephrectomy: seems to be at baseline. Complicates the situation a bit because we will need to carefully given abx and monitor for renal toxicity. Will continue Daptomycin over Vancomycin for now given this issue.     PLAN: Thank you for asking us to see Latoya Molina, I recommend the followin. Continue Daptomycin for 1 more dose in this morning then will plan for transition to Zyvox 600mg PO BID for an additional 10 day course.  2. Culture and GS from purulent fluid expressed from wound (reportedly MRSA from outside clinic)  3. CT scan negative for osteomyelitis  4. I have requested a follow up appointment with me on 18. Office will call patient with appointment time.  5. Case mgmt consulted for Zyvox approval.  6. Ok for discharge in today after Daptomcyin dose if Zyvox approved unless foot is looking worse    High risk if she became septicemia she could seed her bilateral knee replacements or spinal stimulator hardware.I discussed potential risks and benefits of the prescribed antibiotics that include, but are not limited to, solid organ toxicity, cytopenias, hypersensitivity, serotonin syndrome, etc. Patient denies taking any antidepressant medications. Patient voices understanding and agree to proceed. I have discussed with patient's family today.    UM/MAYELA:  Case mgmt consulted for zyvox prior authorization and arrange for oral abx. AOP follow up appointment scheduled.    Farhan Meredith MD  2018

## 2018-08-01 NOTE — THERAPY EVALUATION
Acute Care - Physical Therapy Initial Evaluation  [facility]     Patient Name: Latoya Molina  : 1940  MRN: [id number]  Today's Date: 2018   Onset of Illness/Injury or Date of Surgery: 18  Date of Referral to PT: 18  Referring Physician: MD Billie      Admit Date: 2018    Visit Dx:     ICD-10-CM ICD-9-CM   1. Cellulitis of left lower extremity L03.116 682.6   2. Renal insufficiency N28.9 593.9   3. Impaired functional mobility and activity tolerance Z74.09 V49.89     Patient Active Problem List   Diagnosis   • Acute renal failure (CMS/HCC)   • Adverse effects of medication, initial encounter   • Essential hypertension   • Paronychia of second toe, right   • Onychomycosis of toenail   • Left 2nd toe Cellulitis and wound   • Restless leg   • Neuropathy     Past Medical History:   Diagnosis Date   • Arthritis    • Hyperlipidemia    • Hypertension    • Neuropathy    • Renal disorder      Past Surgical History:   Procedure Laterality Date   • BLADDER SURGERY     • CHOLECYSTECTOMY     • COLONOSCOPY     • HYSTERECTOMY     • IMPLANTATION VAGAL NERVE STIMULATOR     • NEPHRECTOMY     • RENAL MASS EXCISION     • REPLACEMENT TOTAL KNEE          PT ASSESSMENT (last 12 hours)      Physical Therapy Evaluation     Row Name 18 0825          PT Evaluation Time/Intention    Subjective Information complains of;pain  -BD     Document Type evaluation  -BD     Mode of Treatment individual therapy  -BD     Patient Effort good  -BD     Symptoms Noted During/After Treatment none  -BD     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   -BD     Row Name 18 0825          General Information    Patient Profile Reviewed? yes  -BD     Onset of Illness/Injury or Date of Surgery 18  -BD     Referring Physician MD Billie  -BD     Patient Observations alert;cooperative;agree to therapy  -BD     General Observations of Patient Pt sitting on EOB. L 2nd toe bandaged.  -BD     Prior Level of  Function independent:;gait;ADL's;home management;cooking;cleaning;shopping  -BD     Equipment Currently Used at Home none   Pt [facility] R walker, elevated commode[facility]  -BD     Pertinent History of Current Functional Problem Pt with cellulitis of L foot 2nd toe. Pt with hx of peripheral neuropathy, B knee joint replacements and spinal stimulater.  -BD     Existing Precautions/Restrictions other (see comments)   L foot 2nd toe precautions for infection   -BD     Risks Reviewed patient:;LOB;dizziness  -BD     Benefits Reviewed patient:;improve function;increase strength  -BD     Barriers to Rehab none identified  -BD     Row Name 08/01/18 0825          Relationship/Environment    Primary Source of Support/Comfort spouse  -BD     Lives With spouse  -BD     Family Caregiver if Needed child(kath), adult  -BD     Row Name 08/01/18 0825          Resource/Environmental Concerns    Current Living Arrangements home/apartment/condo  -BD     Resource/Environmental Concerns none  -BD     Row Name 08/01/18 0825          Cognitive Assessment/Intervention- PT/OT    Orientation Status (Cognition) oriented x 4  -BD     Follows Commands (Cognition) WNL  -BD     Safety Deficit (Cognitive) mild deficit  -BD     Row Name 08/01/18 0825          Safety Issues, Functional Mobility    Safety Issues Affecting Function (Mobility) awareness of need for assistance  -BD     Comment, Safety Issues/Impairments (Mobility) Pt needs to use walker, decreased use of L foot with amb.  -BD     Row Name 08/01/18 0825          Bed Mobility Assessment/Treatment    Bed Mobility Assessment/Treatment supine-sit  -BD     Supine-Sit [place] (Bed Mobility) supervision  -BD     Comment (Bed Mobility) supervision only  -BD     Row Name 08/01/18 0825          Transfer Assessment/Treatment    Transfer Assessment/Treatment sit-stand transfer;stand-sit transfer  -BD     Maintains Weight-bearing Status (Transfers) able to maintain  -BD     Comment  (Transfers) Assist for safety  -     Sit-Stand [place] (Transfers) supervision  -     Stand-Sit [place] (Transfers) supervision;verbal cues  -     Row Name 08/01/18 0825          Sit-Stand Transfer    Assistive Device (Sit-Stand Transfers) walker, front-wheeled  -BD     Row Name 08/01/18 0825          Stand-Sit Transfer    Assistive Device (Stand-Sit Transfers) walker, front-wheeled  -BD     Row Name 08/01/18 0825          Gait/Stairs Assessment/Training    91603 - Gait Training Minutes  15  -     Gait/Stairs Assessment/Training gait/ambulation independence;gait/ambulation assistive device;distance ambulated;gait pattern  -     [place] Level (Gait) set up;supervision;verbal cues  -     Assistive Device (Gait) walker, front-wheeled  -     Distance in Feet (Gait) 250  -     Pattern (Gait) step-through  -     Deviations/Abnormal Patterns (Gait) gait speed decreased;stride length decreased  -BD     Bilateral Gait Deviations forward flexed posture;heel strike decreased  -     Left Sided Gait Deviations forward flexed posture  -     Maintains Weight-bearing Status (Gait) able to maintain  -     Row Name 08/01/18 0825          General ROM    GENERAL ROM COMMENTS ROM all limbs WFL  -     Row Name 08/01/18 0825          General Assessment (Manual Muscle Testing)    General Manual Muscle Testing (MMT) Assessment no strength deficits identified  -     Row Name 08/01/18 0825          Motor Assessment/Intervention    Additional Documentation Therapeutic Exercise (Group);Therapeutic Exercise Interventions (Group)  -     Row Name 08/01/18 0825          Therapeutic Exercise    98623 - PT Therapeutic Exercise Minutes 12  -     Row Name 08/01/18 0825          Therapeutic Exercise    Lower Extremity (Therapeutic Exercise) quad sets, bilateral;SLR (straight leg raise), bilateral  -     Lower Extremity Range of Motion (Therapeutic Exercise) hip internal/external rotation, right;knee  flexion/extension, bilateral;ankle dorsiflexion/plantar flexion, bilateral  -BD     Position (Therapeutic Exercise) seated  -BD     Expected Outcome (Therapeutic Exercise) facilitate normal movement patterns  -BD     Row Name 08/01/18 0825          Vision Assessment/Intervention    Visual Impairment/Limitations WFL  -BD     Row Name 08/01/18 0825          Pain Assessment    Additional Documentation Pain Scale: Numbers Pre/Post-Treatment (Group)  -BD     Row Name 08/01/18 0825          Pain Scale: Numbers Pre/Post-Treatment    Pain Scale: Numbers, Pretreatment 7/10  -BD     Pain Scale: Numbers, Post-Treatment 5/10  -BD     Pain Location - Side Left  -BD     Pain Location - Orientation posterior  -BD     Pain Location calf  -BD     Pre/Post Treatment Pain Comment Pt c/o leg spasms  -BD     Pain Intervention(s) Medication (See MAR);Repositioned;Ambulation/increased activity  -BD     Row Name             Wound 07/29/18 2000 Left other (see notes)     Wound - Properties Group Date first assessed: 07/29/18  -CR Time first assessed: 2000  -CR Present On Admission : yes  -CR Side: Left  -CR Location: other (see notes)  -CR, 2nd toe  Stage, Pressure Injury: deep tissue injury  -CR Wound Outcome: Other  -CR, podiatry consulted     Row Name 08/01/18 0825          Physical Therapy Clinical Impression    Date of Referral to PT 08/01/18  -BD     PT Diagnosis (PT Clinical Impression) impaired functional mobility  -BD     Patient/Family Goals Statement (PT Clinical Impression) to return home with instructions for toe wound care   -BD     Criteria for Skilled Interventions Met (PT Clinical Impression) yes  -BD     Rehab Potential (PT Clinical Summary) good, to achieve stated therapy goals  -BD     Care Plan Review (PT) evaluation/treatment results reviewed  -BD     Row Name 08/01/18 0825          Vital Signs    Pre Systolic BP Rehab 149  -BD     Pre Treatment Diastolic BP 78  -BD     O2 Delivery Pre Treatment room air  -BD     O2  Delivery Intra Treatment room air  -BD     O2 Delivery Post Treatment room air  -BD     Pre Patient Position Sitting  -BD     Intra Patient Position Standing  -BD     Post Patient Position Sitting  -BD     Row Name 08/01/18 0825          Physical Therapy Goals    Transfer Goal Selection (PT) transfer, PT goal 1  -BD     Gait Training Goal Selection (PT) gait training, PT goal 1  -BD     Row Name 08/01/18 0825          Transfer Goal 1 (PT)    Activity/Assistive Device (Transfer Goal 1, PT) transfers, all;sit-to-stand/stand-to-sit;walker, rolling  -BD     Hazard Level/Cues Needed (Transfer Goal 1, PT) supervision required  -BD     Time Frame (Transfer Goal 1, PT) by discharge  -BD     Progress/Outcome (Transfer Goal 1, PT) goal ongoing  -BD     Row Name 08/01/18 0825          Gait Training Goal 1 (PT)    Activity/Assistive Device (Gait Training Goal 1, PT) gait (walking locomotion);assistive device use;walker, rolling  -BD     Hazard Level (Gait Training Goal 1, PT) independent  -BD     Distance (Gait Goal 1, PT) 300  -BD     Time Frame (Gait Training Goal 1, PT) by discharge  -BD     Progress/Outcome (Gait Training Goal 1, PT) goal ongoing  -BD     Row Name 08/01/18 0825          Patient Education Goal (PT)    Activity (Patient Education Goal, PT) Pt to be aware of precautions and care of L toe wound. Keeping LE elevated, protecting L foot /2nd toe  -BD     Hazard/Cues/Accuracy (Memory Goal 2, PT) verbalizes understanding  -BD     Time Frame (Patient Education Goal, PT) by discharge  -BD     Progress/Outcome (Patient Education Goal, PT) goal ongoing  -BD     Row Name 08/01/18 0825          Positioning and Restraints    Pre-Treatment Position other (comment)  -BD     Post Treatment Position chair   Sitting on EOB  -BD     In Chair notified nsg;reclined;call light within reach;encouraged to call for assist;exit alarm on;legs elevated;heels elevated  -BD       User Key  (r) = Recorded By, (t) = Taken  By, (c) = Cosigned By    Initials Name Provider Type    CR Corrinne Y Rose, RN Registered Nurse     Augusta Rios, JT Physical Therapist          Physical Therapy Education     Title: PT OT SLP Therapies (Active)     Topic: Physical Therapy (Active)     Point: Mobility training (Active)    Learning Progress Summary     Learner Status Readiness Method Response Comment Documented by    Patient Active Acceptance E,D NR   08/01/18 1102          Point: Home exercise program (Active)    Learning Progress Summary     Learner Status Readiness Method Response Comment Documented by    Patient Active Acceptance E,D NR   08/01/18 1102          Point: Body mechanics (Active)    Learning Progress Summary     Learner Status Readiness Method Response Comment Documented by    Patient Active Acceptance E,D NR   08/01/18 1102          Point: Precautions (Active)    Learning Progress Summary     Learner Status Readiness Method Response Comment Documented by    Patient Active Acceptance E,D NR   08/01/18 1102                      User Key     Initials Effective Dates Name Provider Type Discipline     06/08/18 -  Augusta Rios PT Physical Therapist PT                PT Recommendation and Plan  Anticipated Discharge Disposition (PT): home with home health (HH for wound care/ nursing assist with wound)  Planned Therapy Interventions (PT Eval): gait training, home exercise program, transfer training  Therapy Frequency (PT Clinical Impression): daily  Outcome Summary/Treatment Plan (PT)  Anticipated Equipment Needs at Discharge (PT): front wheeled walker (pt may have R walker at home)  Anticipated Discharge Disposition (PT): home with home health (HH for wound care/ nursing assist with wound)  Plan of Care Reviewed With: patient  Progress: improving  Outcome Summary: Pt is ambulating with R walker. Precautions and exercises reviewed with pt. adequate pain control. Pt requested information/instructions on wound  care.          Outcome Measures     Row Name 08/01/18 0825             How much help from another person do you currently need...    Turning from your back to your side while in flat bed without using bedrails? 4  -BD      Moving from lying on back to sitting on the side of a flat bed without bedrails? 4  -BD      Moving to and from a bed to a chair (including a wheelchair)? 3  -BD      Standing up from a chair using your arms (e.g., wheelchair, bedside chair)? 3  -BD      Climbing 3-5 steps with a railing? 3  -BD      To walk in hospital room? 3  -BD      AM-PAC 6 Clicks Score 20  -BD         Functional Assessment    Outcome Measure Options AM-PAC 6 Clicks Basic Mobility (PT)  -BD        User Key  (r) = Recorded By, (t) = Taken By, (c) = Cosigned By    Initials Name Provider Type    DENISE Rios, JT Physical Therapist           Time Calculation:         PT Charges     Row Name 08/01/18 1105 08/01/18 0825          Time Calculation    Start Time 0825  -  --     PT Received On 08/01/18 -  --     PT Goal Re-Cert Due Date 08/15/18  -  --        Time Calculation- PT    Total Timed Code Minutes- PT 30 minute(s)  -BD  --        Timed Charges    93850 - PT Therapeutic Exercise Minutes  -- 12  -BD     49023 - Gait Training Minutes   -- 15  -BD       User Key  (r) = Recorded By, (t) = Taken By, (c) = Cosigned By    Initials Name Provider Type    DENISE Rios, JT Physical Therapist        Therapy Suggested Charges     Code   Minutes Charges    86666 (CPT®) Hc Pt Neuromusc Re Education Ea 15 Min      93291 (CPT®) Hc Pt Ther Proc Ea 15 Min 12 1    22983 (CPT®) Hc Gait Training Ea 15 Min 15 1    24179 (CPT®) Hc Pt Therapeutic Act Ea 15 Min      04419 (CPT®) Hc Pt Manual Therapy Ea 15 Min      83248 (CPT®) Hc Pt Iontophoresis Ea 15 Min      32885 (CPT®) Hc Pt Elec Stim Ea-Per 15 Min      25500 (CPT®) Hc Pt Ultrasound Ea 15 Min      12356 (CPT®) Hc Pt Self Care/Mgmt/Train Ea 15 Min      Total  27 2         Therapy Charges for Today     Code Description Service Date Service Provider Modifiers Qty    54808695610 HC PT THER PROC EA 15 MIN 8/1/2018 Augusta Rios, PT GP 1    06255403106 HC GAIT TRAINING EA 15 MIN 8/1/2018 uAgusta Rios, PT GP 1    28260852645 HC PT EVAL MOD COMPLEXITY 2 8/1/2018 Augusta Rios, PT GP 1          PT G-Codes  Outcome Measure Options: AM-PAC 6 Clicks Basic Mobility (PT)      Augusta Rios, PT  8/1/2018

## 2018-08-01 NOTE — PROGRESS NOTES
Continued Stay Note  [facility]     Patient Name: Latoya Molina  MRN: [id number]  Today's Date: 8/1/2018    Admit Date: 7/29/2018          Discharge Plan     Row Name 08/01/18 1125       Plan    Plan Home on PO Zyvox    Patient/Family in Agreement with Plan yes    Plan Comments CM consulted regarding prior authorization for PO Zyvox. Per CoverMyMeds, patient's insurance does not require PA. Spoke with Vidya at [facility]. Patient's OOP cost for generic Zyvox at [facility]'s retail pharmacy is $7.98. Patient's home pharmacy in [place] does not have the medication in stock and patient's cost there would be $45.31. Met with patient in the room to discuss the discharge plan, and she would like the Zyvox delivered via [facility]'s [id number] service. PT has recommended an off-loading/orthopedic boot as well as home health for skilled nurising. Spoke with Esme at [facility] and she states patient's type of dressing change does not qualify her for home health. Updated patient, and she will have family assist her with dressing changes. JOSUE Gonzalez, will try to arrange a boot for patient. Patient's family will provide her ride. No other discharge needs identified/voiced. CM will continue to follow.    Final Discharge Disposition Code 01 - home or self-care              Discharge Codes    No documentation.       Expected Discharge Date and Time     Expected Discharge Date Expected Discharge Time    Aug 1, 2018             Hayley Morin

## 2018-08-02 ENCOUNTER — TRANSCRIBE ORDERS (OUTPATIENT)
Dept: LAB | Facility: HOSPITAL | Age: 78
End: 2018-08-02

## 2018-08-02 ENCOUNTER — APPOINTMENT (OUTPATIENT)
Dept: LAB | Facility: HOSPITAL | Age: 78
End: 2018-08-02

## 2018-08-02 DIAGNOSIS — L03.116 CELLULITIS OF LEFT FOOT: ICD-10-CM

## 2018-08-02 DIAGNOSIS — G60.8 HEREDITARY SENSORY NEUROPATHY: ICD-10-CM

## 2018-08-02 DIAGNOSIS — L03.032 ABSCESS OF FIFTH TOENAIL OF LEFT FOOT: Primary | ICD-10-CM

## 2018-08-02 DIAGNOSIS — Z96.653 PRESENCE OF BOTH ARTIFICIAL KNEE JOINTS: ICD-10-CM

## 2018-08-02 DIAGNOSIS — B95.62 CELLULITIS DUE TO MRSA: ICD-10-CM

## 2018-08-02 DIAGNOSIS — Z96.89 STATUS POST VNS (VAGUS NERVE STIMULATOR) PLACEMENT: ICD-10-CM

## 2018-08-02 DIAGNOSIS — I10 ESSENTIAL HYPERTENSION, MALIGNANT: ICD-10-CM

## 2018-08-02 DIAGNOSIS — L03.90 CELLULITIS DUE TO MRSA: ICD-10-CM

## 2018-08-02 LAB
ANION GAP SERPL CALCULATED.3IONS-SCNC: 5 MMOL/L (ref 3–11)
BACTERIA SPEC AEROBE CULT: ABNORMAL
BASOPHILS # BLD AUTO: 0.03 10*3/MM3 (ref 0–0.2)
BASOPHILS NFR BLD AUTO: 0.5 % (ref 0–1)
BUN BLD-MCNC: 29 MG/DL (ref 9–23)
BUN/CREAT SERPL: 20.9 (ref 7–25)
CALCIUM SPEC-SCNC: 8.8 MG/DL (ref 8.7–10.4)
CHLORIDE SERPL-SCNC: 105 MMOL/L (ref 99–109)
CO2 SERPL-SCNC: 28 MMOL/L (ref 20–31)
CREAT BLD-MCNC: 1.39 MG/DL (ref 0.6–1.3)
CRP SERPL-MCNC: 1.83 MG/DL (ref 0–1)
DEPRECATED RDW RBC AUTO: 46.4 FL (ref 37–54)
EOSINOPHIL # BLD AUTO: 0.15 10*3/MM3 (ref 0–0.3)
EOSINOPHIL NFR BLD AUTO: 2.3 % (ref 0–3)
ERYTHROCYTE [DISTWIDTH] IN BLOOD BY AUTOMATED COUNT: 13.4 % (ref 11.3–14.5)
ERYTHROCYTE [SEDIMENTATION RATE] IN BLOOD: 35 MM/HR (ref 0–30)
GFR SERPL CREATININE-BSD FRML MDRD: 36 ML/MIN/1.73
GLUCOSE BLD-MCNC: 96 MG/DL (ref 70–100)
GRAM STN SPEC: ABNORMAL
HCT VFR BLD AUTO: 38 % (ref 34.5–44)
HGB BLD-MCNC: 12.4 G/DL (ref 11.5–15.5)
IMM GRANULOCYTES # BLD: 0.02 10*3/MM3 (ref 0–0.03)
IMM GRANULOCYTES NFR BLD: 0.3 % (ref 0–0.6)
LYMPHOCYTES # BLD AUTO: 1.4 10*3/MM3 (ref 0.6–4.8)
LYMPHOCYTES NFR BLD AUTO: 21.8 % (ref 24–44)
MCH RBC QN AUTO: 30.6 PG (ref 27–31)
MCHC RBC AUTO-ENTMCNC: 32.6 G/DL (ref 32–36)
MCV RBC AUTO: 93.8 FL (ref 80–99)
MONOCYTES # BLD AUTO: 0.71 10*3/MM3 (ref 0–1)
MONOCYTES NFR BLD AUTO: 11.1 % (ref 0–12)
NEUTROPHILS # BLD AUTO: 4.12 10*3/MM3 (ref 1.5–8.3)
NEUTROPHILS NFR BLD AUTO: 64.3 % (ref 41–71)
PLATELET # BLD AUTO: 258 10*3/MM3 (ref 150–450)
PMV BLD AUTO: 10.1 FL (ref 6–12)
POTASSIUM BLD-SCNC: 4.9 MMOL/L (ref 3.5–5.5)
RBC # BLD AUTO: 4.05 10*6/MM3 (ref 3.89–5.14)
SODIUM BLD-SCNC: 138 MMOL/L (ref 132–146)
WBC NRBC COR # BLD: 6.41 10*3/MM3 (ref 3.5–10.8)

## 2018-08-02 PROCEDURE — 85025 COMPLETE CBC W/AUTO DIFF WBC: CPT | Performed by: INTERNAL MEDICINE

## 2018-08-02 PROCEDURE — 86140 C-REACTIVE PROTEIN: CPT | Performed by: INTERNAL MEDICINE

## 2018-08-02 PROCEDURE — 36415 COLL VENOUS BLD VENIPUNCTURE: CPT | Performed by: INTERNAL MEDICINE

## 2018-08-02 PROCEDURE — 80048 BASIC METABOLIC PNL TOTAL CA: CPT | Performed by: INTERNAL MEDICINE

## 2018-08-09 ENCOUNTER — TRANSCRIBE ORDERS (OUTPATIENT)
Dept: LAB | Facility: HOSPITAL | Age: 78
End: 2018-08-09

## 2018-08-09 ENCOUNTER — LAB (OUTPATIENT)
Dept: LAB | Facility: HOSPITAL | Age: 78
End: 2018-08-09

## 2018-08-09 DIAGNOSIS — Z96.89 STATUS POST VNS (VAGUS NERVE STIMULATOR) PLACEMENT: ICD-10-CM

## 2018-08-09 DIAGNOSIS — M86.172 ACUTE OSTEOMYELITIS OF LEFT ANKLE OR FOOT (HCC): ICD-10-CM

## 2018-08-09 DIAGNOSIS — B95.62 CELLULITIS DUE TO MRSA: ICD-10-CM

## 2018-08-09 DIAGNOSIS — G60.8 HEREDITARY SENSORY NEUROPATHY: ICD-10-CM

## 2018-08-09 DIAGNOSIS — I10 ESSENTIAL HYPERTENSION, MALIGNANT: ICD-10-CM

## 2018-08-09 DIAGNOSIS — Z88.1 PERSONAL HISTORY OF ALLERGY TO ANTIBIOTIC AGENT: Primary | ICD-10-CM

## 2018-08-09 DIAGNOSIS — Z88.1 PERSONAL HISTORY OF ALLERGY TO ANTIBIOTIC AGENT: ICD-10-CM

## 2018-08-09 DIAGNOSIS — L03.90 CELLULITIS DUE TO MRSA: ICD-10-CM

## 2018-08-09 DIAGNOSIS — L03.116 CELLULITIS OF LEFT FOOT: ICD-10-CM

## 2018-08-09 LAB
ANION GAP SERPL CALCULATED.3IONS-SCNC: 14 MMOL/L (ref 3–11)
BASOPHILS # BLD AUTO: 0.04 10*3/MM3 (ref 0–0.2)
BASOPHILS NFR BLD AUTO: 0.7 % (ref 0–1)
BUN BLD-MCNC: 24 MG/DL (ref 9–23)
BUN/CREAT SERPL: 15.8 (ref 7–25)
CALCIUM SPEC-SCNC: 9 MG/DL (ref 8.7–10.4)
CHLORIDE SERPL-SCNC: 102 MMOL/L (ref 99–109)
CO2 SERPL-SCNC: 26 MMOL/L (ref 20–31)
CREAT BLD-MCNC: 1.52 MG/DL (ref 0.6–1.3)
CRP SERPL-MCNC: 1.14 MG/DL (ref 0–1)
DEPRECATED RDW RBC AUTO: 45.6 FL (ref 37–54)
EOSINOPHIL # BLD AUTO: 0.06 10*3/MM3 (ref 0–0.3)
EOSINOPHIL NFR BLD AUTO: 1.1 % (ref 0–3)
ERYTHROCYTE [DISTWIDTH] IN BLOOD BY AUTOMATED COUNT: 13.3 % (ref 11.3–14.5)
ERYTHROCYTE [SEDIMENTATION RATE] IN BLOOD: 29 MM/HR (ref 0–30)
GFR SERPL CREATININE-BSD FRML MDRD: 33 ML/MIN/1.73
GLUCOSE BLD-MCNC: 87 MG/DL (ref 70–100)
HCT VFR BLD AUTO: 38.4 % (ref 34.5–44)
HGB BLD-MCNC: 12.5 G/DL (ref 11.5–15.5)
IMM GRANULOCYTES # BLD: 0.01 10*3/MM3 (ref 0–0.03)
IMM GRANULOCYTES NFR BLD: 0.2 % (ref 0–0.6)
LYMPHOCYTES # BLD AUTO: 1.3 10*3/MM3 (ref 0.6–4.8)
LYMPHOCYTES NFR BLD AUTO: 24.3 % (ref 24–44)
MCH RBC QN AUTO: 30.4 PG (ref 27–31)
MCHC RBC AUTO-ENTMCNC: 32.6 G/DL (ref 32–36)
MCV RBC AUTO: 93.4 FL (ref 80–99)
MONOCYTES # BLD AUTO: 0.47 10*3/MM3 (ref 0–1)
MONOCYTES NFR BLD AUTO: 8.8 % (ref 0–12)
NEUTROPHILS # BLD AUTO: 3.47 10*3/MM3 (ref 1.5–8.3)
NEUTROPHILS NFR BLD AUTO: 65.1 % (ref 41–71)
PLATELET # BLD AUTO: 179 10*3/MM3 (ref 150–450)
PMV BLD AUTO: 10.2 FL (ref 6–12)
POTASSIUM BLD-SCNC: 4.6 MMOL/L (ref 3.5–5.5)
RBC # BLD AUTO: 4.11 10*6/MM3 (ref 3.89–5.14)
SODIUM BLD-SCNC: 142 MMOL/L (ref 132–146)
WBC NRBC COR # BLD: 5.34 10*3/MM3 (ref 3.5–10.8)

## 2018-08-09 PROCEDURE — 80048 BASIC METABOLIC PNL TOTAL CA: CPT

## 2018-08-09 PROCEDURE — 36415 COLL VENOUS BLD VENIPUNCTURE: CPT

## 2018-08-09 PROCEDURE — 85652 RBC SED RATE AUTOMATED: CPT

## 2018-08-09 PROCEDURE — 86140 C-REACTIVE PROTEIN: CPT

## 2018-08-09 PROCEDURE — 85025 COMPLETE CBC W/AUTO DIFF WBC: CPT

## 2018-08-16 ENCOUNTER — LAB (OUTPATIENT)
Dept: LAB | Facility: HOSPITAL | Age: 78
End: 2018-08-16

## 2018-08-16 ENCOUNTER — TRANSCRIBE ORDERS (OUTPATIENT)
Dept: LAB | Facility: HOSPITAL | Age: 78
End: 2018-08-16

## 2018-08-16 DIAGNOSIS — Z88.1 PERSONAL HISTORY OF ALLERGY TO ANTIBIOTIC AGENT: Primary | ICD-10-CM

## 2018-08-16 DIAGNOSIS — Z88.1 PERSONAL HISTORY OF ALLERGY TO ANTIBIOTIC AGENT: ICD-10-CM

## 2018-08-16 DIAGNOSIS — L03.116 CELLULITIS OF LEFT FOOT: ICD-10-CM

## 2018-08-16 DIAGNOSIS — L03.90 CELLULITIS DUE TO MRSA: ICD-10-CM

## 2018-08-16 DIAGNOSIS — B95.62 CELLULITIS DUE TO MRSA: ICD-10-CM

## 2018-08-16 DIAGNOSIS — L03.032 ABSCESS OF FIFTH TOENAIL OF LEFT FOOT: ICD-10-CM

## 2018-08-16 DIAGNOSIS — G60.8 HEREDITARY SENSORY NEUROPATHY: ICD-10-CM

## 2018-08-16 LAB
ALBUMIN SERPL-MCNC: 4.01 G/DL (ref 3.2–4.8)
ALBUMIN/GLOB SERPL: 1.6 G/DL (ref 1.5–2.5)
ALP SERPL-CCNC: 52 U/L (ref 25–100)
ALT SERPL W P-5'-P-CCNC: 26 U/L (ref 7–40)
ANION GAP SERPL CALCULATED.3IONS-SCNC: 7 MMOL/L (ref 3–11)
AST SERPL-CCNC: 28 U/L (ref 0–33)
BASOPHILS # BLD AUTO: 0.02 10*3/MM3 (ref 0–0.2)
BASOPHILS NFR BLD AUTO: 0.3 % (ref 0–1)
BILIRUB SERPL-MCNC: 0.4 MG/DL (ref 0.3–1.2)
BUN BLD-MCNC: 20 MG/DL (ref 9–23)
BUN/CREAT SERPL: 14.5 (ref 7–25)
CALCIUM SPEC-SCNC: 8.6 MG/DL (ref 8.7–10.4)
CHLORIDE SERPL-SCNC: 103 MMOL/L (ref 99–109)
CO2 SERPL-SCNC: 26 MMOL/L (ref 20–31)
CREAT BLD-MCNC: 1.38 MG/DL (ref 0.6–1.3)
CRP SERPL-MCNC: 0.24 MG/DL (ref 0–1)
DEPRECATED RDW RBC AUTO: 43.5 FL (ref 37–54)
EOSINOPHIL # BLD AUTO: 0.07 10*3/MM3 (ref 0–0.3)
EOSINOPHIL NFR BLD AUTO: 1.2 % (ref 0–3)
ERYTHROCYTE [DISTWIDTH] IN BLOOD BY AUTOMATED COUNT: 13 % (ref 11.3–14.5)
GFR SERPL CREATININE-BSD FRML MDRD: 37 ML/MIN/1.73
GLOBULIN UR ELPH-MCNC: 2.5 GM/DL
GLUCOSE BLD-MCNC: 89 MG/DL (ref 70–100)
HCT VFR BLD AUTO: 36.1 % (ref 34.5–44)
HGB BLD-MCNC: 12 G/DL (ref 11.5–15.5)
IMM GRANULOCYTES # BLD: 0.02 10*3/MM3 (ref 0–0.03)
IMM GRANULOCYTES NFR BLD: 0.3 % (ref 0–0.6)
LYMPHOCYTES # BLD AUTO: 1.64 10*3/MM3 (ref 0.6–4.8)
LYMPHOCYTES NFR BLD AUTO: 27.1 % (ref 24–44)
MCH RBC QN AUTO: 30.3 PG (ref 27–31)
MCHC RBC AUTO-ENTMCNC: 33.2 G/DL (ref 32–36)
MCV RBC AUTO: 91.2 FL (ref 80–99)
MONOCYTES # BLD AUTO: 0.43 10*3/MM3 (ref 0–1)
MONOCYTES NFR BLD AUTO: 7.1 % (ref 0–12)
NEUTROPHILS # BLD AUTO: 3.9 10*3/MM3 (ref 1.5–8.3)
NEUTROPHILS NFR BLD AUTO: 64.3 % (ref 41–71)
PLATELET # BLD AUTO: 89 10*3/MM3 (ref 150–450)
PMV BLD AUTO: 10.9 FL (ref 6–12)
POTASSIUM BLD-SCNC: 4.5 MMOL/L (ref 3.5–5.5)
PROT SERPL-MCNC: 6.5 G/DL (ref 5.7–8.2)
RBC # BLD AUTO: 3.96 10*6/MM3 (ref 3.89–5.14)
SODIUM BLD-SCNC: 136 MMOL/L (ref 132–146)
WBC NRBC COR # BLD: 6.06 10*3/MM3 (ref 3.5–10.8)

## 2018-08-16 PROCEDURE — 36415 COLL VENOUS BLD VENIPUNCTURE: CPT

## 2018-08-16 PROCEDURE — 86140 C-REACTIVE PROTEIN: CPT

## 2018-08-16 PROCEDURE — 80053 COMPREHEN METABOLIC PANEL: CPT

## 2018-08-16 PROCEDURE — 85025 COMPLETE CBC W/AUTO DIFF WBC: CPT

## 2018-10-29 ENCOUNTER — HOSPITAL ENCOUNTER (OUTPATIENT)
Dept: MAMMOGRAPHY | Facility: HOSPITAL | Age: 78
Discharge: HOME | End: 2018-10-29
Admitting: FAMILY MEDICINE

## 2018-10-29 DIAGNOSIS — Z12.39 SCREENING BREAST EXAMINATION: ICD-10-CM

## 2018-10-29 PROCEDURE — 77067 SCR MAMMO BI INCL CAD: CPT

## 2018-10-29 PROCEDURE — 77063 BREAST TOMOSYNTHESIS BI: CPT

## 2019-04-04 ENCOUNTER — OFFICE VISIT (OUTPATIENT)
Dept: ORTHOPEDIC SURGERY | Facility: CLINIC | Age: 79
End: 2019-04-04

## 2019-04-04 ENCOUNTER — APPOINTMENT (OUTPATIENT)
Dept: LAB | Facility: HOSPITAL | Age: 79
End: 2019-04-04

## 2019-04-04 VITALS — HEART RATE: 64 BPM | HEIGHT: 65 IN | OXYGEN SATURATION: 99 % | BODY MASS INDEX: 26.15 KG/M2 | WEIGHT: 156.97 LBS

## 2019-04-04 DIAGNOSIS — Z96.652 PAIN DUE TO TOTAL LEFT KNEE REPLACEMENT, INITIAL ENCOUNTER (HCC): Primary | ICD-10-CM

## 2019-04-04 DIAGNOSIS — Z96.652 STATUS POST TOTAL LEFT KNEE REPLACEMENT: ICD-10-CM

## 2019-04-04 DIAGNOSIS — T84.84XA PAIN DUE TO TOTAL LEFT KNEE REPLACEMENT, INITIAL ENCOUNTER (HCC): Primary | ICD-10-CM

## 2019-04-04 PROCEDURE — 85025 COMPLETE CBC W/AUTO DIFF WBC: CPT | Performed by: ORTHOPAEDIC SURGERY

## 2019-04-04 PROCEDURE — 99204 OFFICE O/P NEW MOD 45 MIN: CPT | Performed by: ORTHOPAEDIC SURGERY

## 2019-04-04 PROCEDURE — 86140 C-REACTIVE PROTEIN: CPT | Performed by: ORTHOPAEDIC SURGERY

## 2019-04-04 NOTE — PROGRESS NOTES
Orthopaedic Clinic Note: Knee New Patient    Chief Complaint   Patient presents with   • Left Knee - Pain        HPI    Latoya Molina is a 78 y.o. female who presents with left knee pain for 3 year(s). Onset began about 3 years ago when she bumped her knee into a dryer.  She has since had some vague lateral based knee pain.  She is status post total knee arthroplasty by Dr. Lee in 2014.  She denies any initial postoperative complications.  Her knee did well up until early 2016 when she sustained that injury.  Since then she has had some vague lateral based knee pain.  She denies any swelling or instability of the knee joint.  She states that her pain is worse with walking, sitting, climbing stairs.  She is attempted treatments with Aleve and resting.  On average, she rates her pain an 8/10 on the pain scale.  Today she describes it as a 2/10 on the pain scale.  She is ambulating with the assistance of a cane.  She denies fevers chills or constitutional symptoms.    Past Medical History:   Diagnosis Date   • Arthritis    • Hyperlipidemia    • Hypertension    • Neuropathy    • Renal disorder       Past Surgical History:   Procedure Laterality Date   • BLADDER SURGERY     • CHOLECYSTECTOMY     • COLONOSCOPY     • HYSTERECTOMY     • IMPLANTATION VAGAL NERVE STIMULATOR     • NEPHRECTOMY     • RENAL MASS EXCISION     • REPLACEMENT TOTAL KNEE Left     2014- Dr Lee      Family History   Problem Relation Age of Onset   • Coronary artery disease Neg Hx    • Diabetes Neg Hx      Social History     Socioeconomic History   • Marital status:      Spouse name: Not on file   • Number of children: Not on file   • Years of education: Not on file   • Highest education level: Not on file   Tobacco Use   • Smoking status: Never Smoker   • Smokeless tobacco: Never Used   Substance and Sexual Activity   • Alcohol use: No   • Drug use: No   • Sexual activity: Defer   Social History Narrative    Lives in [facility] with  "her     Denies any pets or pet scratches to her toe      Current Outpatient Medications on File Prior to Visit   Medication Sig Dispense Refill   • acetaminophen (TYLENOL) 650 MG 8 hr tablet Take 650 mg by mouth Every 8 (Eight) Hours As Needed.     • atenolol (TENORMIN) 50 MG tablet Take 50 mg by mouth Daily.     • estrogens, conjugated, (PREMARIN) 0.625 MG tablet Take 0.625 mg by mouth Daily. Take daily for 21 days then do not take for 7 days.     • lisinopril (PRINIVIL,ZESTRIL) 20 MG tablet Take 20 mg by mouth Daily.     • pregabalin (LYRICA) 100 MG capsule Take 100 mg by mouth 2 (Two) Times a Day.     • rOPINIRole (REQUIP) 0.25 MG tablet Take 0.25 mg by mouth 3 (Three) Times a Day.     • rOPINIRole (REQUIP) 0.5 MG tablet Take 0.5 mg by mouth Every Night. Take 1 hour before bedtime.       No current facility-administered medications on file prior to visit.       No Known Allergies     Review of Systems   Constitutional: Negative.    HENT: Negative.    Eyes: Negative.    Respiratory: Negative.    Cardiovascular: Negative.    Gastrointestinal: Negative.    Endocrine: Negative.    Genitourinary: Negative.    Musculoskeletal: Positive for arthralgias.   Skin: Negative.    Allergic/Immunologic: Negative.    Neurological: Negative.    Hematological: Negative.    Psychiatric/Behavioral: Negative.         The following portions of the patient's history were reviewed and updated as appropriate: allergies, current medications, past family history, past medical history, past social history, past surgical history and problem list.    Physical Exam  Pulse 64, height 165.1 cm (65\"), weight 71.2 kg (156 lb 15.5 oz), SpO2 99 %.    Body mass index is 26.12 kg/m².    GENERAL APPEARANCE: awake, alert & oriented x 3, in no acute distress and well developed, well nourished  PSYCH: normal affect  LUNGS:  breathing nonlabored  EYES: sclera anicteric  CARDIOVASCULAR: palpable dorsalis pedis, palpable posterior tibial bilaterally. " Capillary refill less than 2 seconds  EXTREMITIES: no clubbing, cyanosis  GAIT:  Shuffling            Right Lower Extremity Exam:   ----------  Hip Exam  ----------  FLEXION CONTRACTURE: None  FLEXION: 110 degrees  INTERNAL ROTATION: 20 degrees at 90 degrees of flexion   EXTERNAL ROTATION: 40 degrees at 90 degrees of flexion    PAIN WITH HIP MOTION: no  ----------  Knee Exam  ----------  ALIGNMENT: neutral, no varus or valgus deformity     RANGE OF MOTION:  Normal (0-120 degrees) with no extensor lag or flexion contracture  LIGAMENTOUS STABILITY:   stable to varus and valgus stress at 0 and 30 degrees without any evidence of laxity     STRENGTH:  5/5 knee flexion, extension. 5/5 ankle dorsiflexion and plantarflexion.     PAIN WITH PALPATION: denies tenderness to palpation about the knee, denies medial or lateral joint line pain  KNEE EFFUSION: no  PAIN WITH KNEE ROM: no  PATELLAR CREPITUS: no  SPECIAL EXAM FINDINGS:  Negative patellar compression    REFLEXES:  PATELLAR 2+/4  ACHILLES 2+/4    CLONUS: negative  STRAIGHT LEG TEST:   negative    SENSATION TO LIGHT TOUCH:  DEEP PERONEAL/SUPERFICIAL PERONEAL/SURAL/SAPHENOUS/TIBIAL:   intact    EDEMA:  no  ERYTHEMA:  no  WOUNDS/INCISIONS: Well-healed anterior knee incision, no overlying skin problems.      Left Lower Extremity Exam:   ----------  Hip Exam  ----------  FLEXION CONTRACTURE: None  FLEXION: 110 degrees  INTERNAL ROTATION: 20 degrees at 90 degrees of flexion   EXTERNAL ROTATION: 40 degrees at 90 degrees of flexion    PAIN WITH HIP MOTION: no  ----------  Knee Exam  ----------  ALIGNMENT: neutral, no varus or valgus deformity     RANGE OF MOTION:  Normal (0-120 degrees) with no extensor lag or flexion contracture  LIGAMENTOUS STABILITY:   stable to varus and valgus stress at 0 and 30 degrees without any evidence of laxity     STRENGTH:  5/5 knee flexion, extension. 5/5 ankle dorsiflexion and plantarflexion.     PAIN WITH PALPATION: denies tenderness to palpation  about the knee, denies medial or lateral joint line pain  KNEE EFFUSION: no  PAIN WITH KNEE ROM: no  PATELLAR CREPITUS: no  SPECIAL EXAM FINDINGS:  Negative patellar compression    REFLEXES:  PATELLAR 2+/4  ACHILLES 2+/4    CLONUS: negative  STRAIGHT LEG TEST:   negative    SENSATION TO LIGHT TOUCH:  DEEP PERONEAL/SUPERFICIAL PERONEAL/SURAL/SAPHENOUS/TIBIAL:   intact    EDEMA:  no  ERYTHEMA:  no  WOUNDS/INCISIONS: Well-healed anterior knee incision, no overlying skin problems.    ______________________________________________________________________  ______________________________________________________________________    RADIOGRAPHIC FINDINGS:   Indication: Left knee pain    Comparison: Todays xrays were compared to previous xrays from 8/9/2016    Left knee(s) 2 views: Demonstrate well positioned knee arthroplasty components in satisfactory alignment without evidence of wear, loosening, subsidence, fracture, or osteolysis and No significant changes compared to prior radiographs.  There is a stable lucency along the posterior condyle of the femur that remains unchanged compared to radiographs several years ago including postop radiographs.      Assessment/Plan:   Diagnosis Plan   1. Pain due to total left knee replacement, initial encounter (CMS/[facility])  CBC Auto Differential    C-reactive Protein    Sedimentation Rate   2. Status post total left knee replacement  XR Knee 1 or 2 View Left     I discussed with the patient that the etiology of her total knee pain is uncertain at this time.  Clinically and radiographically, I see no evidence of complication.  She seems to have a clinically stable knee exam with no evidence of instability.  Her radiographs do not show any evidence of malalignment or loosening.  I am somewhat suspicious that she has some pain along the IT band but I am unable to elicit that on exam today.  We will obtain baseline lab work to evaluate for potential infection.  I will see her back next week  to discuss the results of the lab work and how to proceed.    Abran De Dios MD  04/04/19  11:24 AM

## 2019-04-11 ENCOUNTER — OFFICE VISIT (OUTPATIENT)
Dept: ORTHOPEDIC SURGERY | Facility: CLINIC | Age: 79
End: 2019-04-11

## 2019-04-11 VITALS — WEIGHT: 156.97 LBS | BODY MASS INDEX: 26.15 KG/M2 | HEART RATE: 69 BPM | OXYGEN SATURATION: 97 % | HEIGHT: 65 IN

## 2019-04-11 DIAGNOSIS — Z96.652 PAIN DUE TO TOTAL LEFT KNEE REPLACEMENT, INITIAL ENCOUNTER (HCC): ICD-10-CM

## 2019-04-11 DIAGNOSIS — M19.072 ARTHRITIS OF LEFT FOOT: Primary | ICD-10-CM

## 2019-04-11 DIAGNOSIS — T84.84XA PAIN DUE TO TOTAL LEFT KNEE REPLACEMENT, INITIAL ENCOUNTER (HCC): ICD-10-CM

## 2019-04-11 PROCEDURE — 99213 OFFICE O/P EST LOW 20 MIN: CPT | Performed by: ORTHOPAEDIC SURGERY

## 2019-04-11 NOTE — PROGRESS NOTES
Orthopaedic Clinic Note: Knee Established Patient    Chief Complaint   Patient presents with   • Left Knee - Follow-up, Pain     1 week f/u        HPI    It has been 1  week(s) since Ms. Molina's last visit. She returns to clinic today for follow-up left painful knee arthroplasty.  She rates her pain 5/10 on the pain scale.  She describes an aching sensation localized to the lateral aspect of the knee.  She is ambulating with the assistance of a cane.  She is here today to discuss the results of her laboratory workup to evaluate for potential infection.  She denies fevers chills or constitutional symptoms.    Past Medical History:   Diagnosis Date   • Arthritis    • Hyperlipidemia    • Hypertension    • Neuropathy    • Renal disorder       Past Surgical History:   Procedure Laterality Date   • BLADDER SURGERY     • CHOLECYSTECTOMY     • COLONOSCOPY     • HYSTERECTOMY     • IMPLANTATION VAGAL NERVE STIMULATOR     • NEPHRECTOMY     • RENAL MASS EXCISION     • REPLACEMENT TOTAL KNEE Left     2014- Dr Lee      Family History   Problem Relation Age of Onset   • Coronary artery disease Neg Hx    • Diabetes Neg Hx      Social History     Socioeconomic History   • Marital status:      Spouse name: Not on file   • Number of children: Not on file   • Years of education: Not on file   • Highest education level: Not on file   Tobacco Use   • Smoking status: Never Smoker   • Smokeless tobacco: Never Used   Substance and Sexual Activity   • Alcohol use: No   • Drug use: No   • Sexual activity: Defer   Social History Narrative    Lives in [facility] with her     Denies any pets or pet scratches to her toe      Current Outpatient Medications on File Prior to Visit   Medication Sig Dispense Refill   • acetaminophen (TYLENOL) 650 MG 8 hr tablet Take 650 mg by mouth Every 8 (Eight) Hours As Needed.     • atenolol (TENORMIN) 50 MG tablet Take 50 mg by mouth Daily.     • estrogens, conjugated, (PREMARIN) 0.625  "MG tablet Take 0.625 mg by mouth Daily. Take daily for 21 days then do not take for 7 days.     • lisinopril (PRINIVIL,ZESTRIL) 20 MG tablet Take 20 mg by mouth Daily.     • pregabalin (LYRICA) 100 MG capsule Take 100 mg by mouth 2 (Two) Times a Day.     • rOPINIRole (REQUIP) 0.25 MG tablet Take 0.25 mg by mouth 3 (Three) Times a Day.     • rOPINIRole (REQUIP) 0.5 MG tablet Take 0.5 mg by mouth Every Night. Take 1 hour before bedtime.       No current facility-administered medications on file prior to visit.       No Known Allergies     Review of Systems   Constitutional: Negative.    HENT: Negative.    Eyes: Negative.    Respiratory: Negative.    Cardiovascular: Positive for leg swelling.   Gastrointestinal: Negative.    Endocrine: Negative.    Genitourinary: Negative.    Musculoskeletal: Positive for arthralgias, back pain and joint swelling.   Skin: Negative.    Allergic/Immunologic: Positive for environmental allergies.   Neurological: Negative.    Hematological: Bruises/bleeds easily.   Psychiatric/Behavioral: Negative.         Physical Exam  Pulse 69, height 165.1 cm (65\"), weight 71.2 kg (156 lb 15.5 oz), SpO2 97 %, not currently breastfeeding.    Body mass index is 26.12 kg/m².    GENERAL APPEARANCE: awake, alert, oriented, in no acute distress and well developed, well nourished  LUNGS:  breathing nonlabored  EXTREMITIES: no clubbing, cyanosis  PERIPHERAL PULSES: palpable dorsalis pedis and posterior tibial pulses bilaterally.    GAIT:  Normal        ----------  Left Knee Exam:  ----------  ALIGNMENT: neutral  ----------  RANGE OF MOTION:  Normal (0-120 degrees) with no extensor lag or flexion contracture  LIGAMENTOUS STABILITY:   stable to varus and valgus stress at terminal extension and 30 degrees without any evidence of laxity  ----------  STRENGTH:  KNEE FLEXION 5/5  KNEE EXTENSION  5/5  ANKLE DORSIFLEXION  5/5  ANKLE PLANTARFLEXION  5/5  ----------  PAIN WITH PALPATION:denies tenderness to palpation " about the knee  KNEE EFFUSION: no  PAIN WITH KNEE ROM: no  PATELLAR CREPITUS:  no  ----------  SENSATION TO LIGHT TOUCH:  DEEP PERONEAL/SUPERFICIAL PERONEAL/SURAL/SAPHENOUS/TIBIAL:    intact  ----------  EDEMA:  no  ERYTHEMA:    no  WOUNDS/INCISIONS:  yes, well-healed anterior knee incision    Patient has significant midfoot deformity and pes planus alignment of the knee with midfoot valgus.  _____________________________________________________________________  _____________________________________________________________________    RADIOGRAPHIC FINDINGS:   No new radiographs today    Left foot radiographs from 7/30/2018 were personally reviewed.  Radiographs demonstrate severe midfoot arthritis with obliteration of the joint spaces and valgus alignment.    Assessment/Plan:   Diagnosis Plan   1. Arthritis of left foot     2. Pain due to total left knee replacement, initial encounter (CMS/[facility])       I discussed with patient the results of her laboratory workup.  Her sed rate, CRP, and CBC all are within normal limits.  This does not indicate infectious etiology.  She does have significant midfoot arthritis and pes planus deformity that I believe is contributing to an altered gait pattern that is affecting her gait mechanics and resulting in referred pain to the knee.  I recommended further treatment of her foot with orthotics from her podiatrist.  I have no further recommendations in regards to the left knee as I do not believe there is a discernible reason for her knee pain apart from the referred pain from her foot.  She will follow-up as needed.      Abran De Dios MD  04/11/19  11:07 AM

## 2019-08-08 ENCOUNTER — LAB (OUTPATIENT)
Dept: LAB | Facility: HOSPITAL | Age: 79
End: 2019-08-08

## 2019-08-08 ENCOUNTER — OFFICE VISIT (OUTPATIENT)
Dept: ORTHOPEDIC SURGERY | Facility: CLINIC | Age: 79
End: 2019-08-08

## 2019-08-08 VITALS — BODY MASS INDEX: 26.41 KG/M2 | HEIGHT: 65 IN | WEIGHT: 158.51 LBS | OXYGEN SATURATION: 98 % | HEART RATE: 70 BPM

## 2019-08-08 DIAGNOSIS — I89.0 LYMPHEDEMA OF BOTH LOWER EXTREMITIES: ICD-10-CM

## 2019-08-08 DIAGNOSIS — M70.42 PREPATELLAR BURSITIS OF LEFT KNEE: ICD-10-CM

## 2019-08-08 DIAGNOSIS — M70.42 PREPATELLAR BURSITIS OF LEFT KNEE: Primary | ICD-10-CM

## 2019-08-08 DIAGNOSIS — M25.562 LEFT KNEE PAIN, UNSPECIFIED CHRONICITY: ICD-10-CM

## 2019-08-08 LAB
CRP SERPL-MCNC: 1.1 MG/DL (ref 0–0.5)
ERYTHROCYTE [SEDIMENTATION RATE] IN BLOOD: 24 MM/HR (ref 0–30)

## 2019-08-08 PROCEDURE — 36415 COLL VENOUS BLD VENIPUNCTURE: CPT

## 2019-08-08 PROCEDURE — 99214 OFFICE O/P EST MOD 30 MIN: CPT | Performed by: ORTHOPAEDIC SURGERY

## 2019-08-08 PROCEDURE — 85652 RBC SED RATE AUTOMATED: CPT

## 2019-08-08 PROCEDURE — 86140 C-REACTIVE PROTEIN: CPT

## 2019-08-08 RX ORDER — ACETAMINOPHEN AND CODEINE PHOSPHATE 300; 30 MG/1; MG/1
1 TABLET ORAL EVERY 6 HOURS PRN
Refills: 0 | COMMUNITY
Start: 2019-07-27

## 2019-08-08 RX ORDER — CLINDAMYCIN HYDROCHLORIDE 300 MG/1
CAPSULE ORAL
Refills: 0 | COMMUNITY
Start: 2019-08-02

## 2019-08-08 NOTE — PROGRESS NOTES
Orthopaedic Clinic Note: Knee Established Patient    Chief Complaint   Patient presents with   • Left Knee - Follow-up        HPI    It has been 4  month(s) since Ms. Molina's last visit. She returns to clinic today for new complaint of left knee prepatellar swelling and edema within the leg.  Patient states she sustained a fall about a month ago and had some swelling along the anterior aspect of her knee.  She was still able to ambulate with no increased pain in the knee joint and her range of motion was sustained.  She was concerned when the swelling persisted for about 2 weeks and went to her primary care doctor who started her on clindamycin due to concern for cellulitis.  Patient initially did well but the cellulitis/redness continued thus prompting her to go to the ER yesterday in [place] for evaluation.  ER performed a baseline lab work with a white blood cell count of 5.2 and a neutrophil percentage of 62%.  Despite this, they felt that there was potential abscess localized in the prepatellar bursa area and performed a lancing procedure.  Per the patient, serosanguineous fluid was expressed from the area.  She was instructed to continue the clindamycin and follow-up with her orthopedic surgeon.  She presents to clinic today for evaluation of this.  She denies fevers chills or constitutional symptoms.  She is ambulating with the assistance of a cane.    Past Medical History:   Diagnosis Date   • Arthritis    • Hyperlipidemia    • Hypertension    • Neuropathy    • Renal disorder       Past Surgical History:   Procedure Laterality Date   • BLADDER SURGERY     • CHOLECYSTECTOMY     • COLONOSCOPY     • HYSTERECTOMY     • IMPLANTATION VAGAL NERVE STIMULATOR     • NEPHRECTOMY     • RENAL MASS EXCISION     • REPLACEMENT TOTAL KNEE Left     2014- Dr Lee      Family History   Problem Relation Age of Onset   • Coronary artery disease Neg Hx    • Diabetes Neg Hx      Social History     Socioeconomic History   •  Marital status:      Spouse name: Not on file   • Number of children: Not on file   • Years of education: Not on file   • Highest education level: Not on file   Tobacco Use   • Smoking status: Never Smoker   • Smokeless tobacco: Never Used   Substance and Sexual Activity   • Alcohol use: No   • Drug use: No   • Sexual activity: Defer   Social History Narrative    Lives in [facility] with her     Denies any pets or pet scratches to her toe      Current Outpatient Medications on File Prior to Visit   Medication Sig Dispense Refill   • acetaminophen (TYLENOL) 650 MG 8 hr tablet Take 650 mg by mouth Every 8 (Eight) Hours As Needed.     • acetaminophen-codeine (TYLENOL #3) 300-30 MG per tablet Take 1 tablet by mouth Every 6 (Six) Hours As Needed.  0   • atenolol (TENORMIN) 50 MG tablet Take 50 mg by mouth Daily.     • clindamycin (CLEOCIN) 300 MG capsule take 1 capsule by mouth every 6 hours for 7 days until finished  0   • estrogens, conjugated, (PREMARIN) 0.625 MG tablet Take 0.625 mg by mouth Daily. Take daily for 21 days then do not take for 7 days.     • lisinopril (PRINIVIL,ZESTRIL) 20 MG tablet Take 20 mg by mouth Daily.     • pregabalin (LYRICA) 100 MG capsule Take 100 mg by mouth 2 (Two) Times a Day.     • psyllium (METAMUCIL) 58.6 % packet Take 1 packet by mouth Daily.     • rOPINIRole (REQUIP) 0.25 MG tablet Take 0.25 mg by mouth 3 (Three) Times a Day.     • rOPINIRole (REQUIP) 0.5 MG tablet Take 0.5 mg by mouth Every Night. Take 1 hour before bedtime.       No current facility-administered medications on file prior to visit.       No Known Allergies     Review of Systems   Constitutional: Negative.    HENT: Negative.    Eyes: Negative.    Respiratory: Negative.    Cardiovascular: Positive for leg swelling.   Gastrointestinal: Negative.    Endocrine: Negative.    Genitourinary: Negative.    Musculoskeletal: Positive for gait problem and joint swelling.   Skin: Negative.   "  Allergic/Immunologic: Negative.    Hematological: Negative.    Psychiatric/Behavioral: Negative.         The patient's Review of Systems was personally reviewed and confirmed as accurate.    Physical Exam  Pulse 70, height 165.1 cm (65\"), weight 71.9 kg (158 lb 8.2 oz), SpO2 98 %, not currently breastfeeding.    Body mass index is 26.38 kg/m².    GENERAL APPEARANCE: awake, alert, oriented, in no acute distress and well developed, well nourished  LUNGS:  breathing nonlabored  EXTREMITIES: no clubbing, cyanosis  PERIPHERAL PULSES: palpable dorsalis pedis and posterior tibial pulses bilaterally.    GAIT:  Normal  with cane       ----------  Left Knee Exam:  ----------  ALIGNMENT: neutral  ----------  RANGE OF MOTION:  Normal (0-120 degrees) with no extensor lag or flexion contracture  LIGAMENTOUS STABILITY:   stable to varus and valgus stress at terminal extension and 30 degrees without any evidence of laxity  ----------  STRENGTH:  KNEE FLEXION 5/5  KNEE EXTENSION  5/5  ANKLE DORSIFLEXION  5/5  ANKLE PLANTARFLEXION  5/5  ----------  PAIN WITH PALPATION:denies tenderness to palpation about the medial and lateral joint line.  She does have some mild tenderness and swelling along the prepatellar area  KNEE EFFUSION: no palpable knee effusion.  She does have prepatellar bursal swelling  PAIN WITH KNEE ROM: no  PATELLAR CREPITUS:  no  ----------  SENSATION TO LIGHT TOUCH:  DEEP PERONEAL/SUPERFICIAL PERONEAL/SURAL/SAPHENOUS/TIBIAL:    intact  ----------  EDEMA:   2+ edema of the proximal tibia  ERYTHEMA:  Slight area of erythema along the anterior prepatellar bursal area extending down to the proximal tibia  WOUNDS/INCISIONS:  yes, well-healed anterior knee incision.  There is a prior lancing area that is in the prepatellar region that only expresses serous fluid  _____________________________________________________________________  _____________________________________________________________________    RADIOGRAPHIC " FINDINGS:   Indication: Left knee swelling    Comparison: Todays xrays were compared to previous xrays from 4/4/2019    Knee films: Demonstrate well positioned knee arthroplasty components in satisfactory alignment without evidence of wear, loosening, subsidence, fracture, or osteolysis and No significant changes compared to prior radiographs.    Assessment/Plan:   Diagnosis Plan   1. Prepatellar bursitis of left knee  C-reactive Protein    Sedimentation Rate   2. Left knee pain, unspecified chronicity  XR Knee 3+ View With [place] Left     Patient's pain is appears to be from prepatellar bursitis.  Is unclear if there is actually an infectious etiology.  No sed rate or CRP has been obtained from her prior providers.  The CBC with white blood cell count and neutrophil differentiation are not at all indicative of infection.  She has already been on clindamycin for nearly a week and has a couple of days left.  I recommended that she complete this.  We will obtain a baseline sed rate and CRP today.  She demonstrates no signs or symptoms of infection today apart from some redness and swelling localized to the prepatellar bursa area and extending down the anterior tibia.  I recommended conservative treatment upon completion of the antibiotics with rest, ice, elevation, and compression socks.  I am somewhat suspicious that her diagnosis is simply contusion with prepatellar bursitis and hematoma formation.  I certainly see no evidence of intra-articular involvement and the knee itself appears to be functioning well.  I will see her back next week for repeat assessment.  If infection becomes more concerning, additional work-up and/or referral to infectious disease may be necessary.      Abran De Dios MD  08/08/19  10:12 AM

## 2019-08-15 ENCOUNTER — OFFICE VISIT (OUTPATIENT)
Dept: ORTHOPEDIC SURGERY | Facility: CLINIC | Age: 79
End: 2019-08-15

## 2019-08-15 VITALS — BODY MASS INDEX: 26.41 KG/M2 | WEIGHT: 158.51 LBS | HEIGHT: 65 IN | OXYGEN SATURATION: 98 % | HEART RATE: 60 BPM

## 2019-08-15 DIAGNOSIS — M70.42 PREPATELLAR BURSITIS OF LEFT KNEE: Primary | ICD-10-CM

## 2019-08-15 DIAGNOSIS — R60.0 LOWER LEG EDEMA: ICD-10-CM

## 2019-08-15 PROCEDURE — 99213 OFFICE O/P EST LOW 20 MIN: CPT | Performed by: PHYSICIAN ASSISTANT

## 2019-08-15 NOTE — PROGRESS NOTES
"    [facility] Note    Subjective     CC: Follow-up (1 week recheck - Prepatellar bursitis of left knee )      HPI    Latoya Molina is a 79 y.o. female.  Patient returns today for one-week wound check left knee prepatellar bursitis.  She reports she is doing much better.  Her pain and lower leg swelling has improved.  She now endorses a pain scale 4/10, intermittent.  She had improvement of the swelling to the anterior aspect of the knee.  The bruising that was also noted is resolving.  Patient is currently using Aleve for pain control.  No new complaints or issues.    Patient previously seen and evaluated by Dr. De Dios.    ROS:    Constiutional:Pt denies fever, chills, nausea, or vomiting.  MSK:as above    Objective      Past Medical History  Past Medical History:   Diagnosis Date   • Arthritis    • Hyperlipidemia    • Hypertension    • Neuropathy    • Renal disorder          Physical Exam  Pulse 60   Ht 165.1 cm (65\")   Wt 71.9 kg (158 lb 8.2 oz)   SpO2 98%   BMI 26.38 kg/m²     Body mass index is 26.38 kg/m².    Patient is well nourished and well developed.        Ortho Exam  Left knee  Skin: Well-healed anterior knee incision without warmth or drainage.  Tenderness: No palpable tenderness to the medial and lateral joint line.  She does have just slight discomfort noted to the prepatellar area.  No evidence of knee effusion.  Previously noted soft tissue swelling over the prepatellar bursa has significantly improved since last week.  Motion: 0-120 degrees  Axial load: Negative    Bilateral lower leg edema left greater than right.      Imaging/Labs/EMG Reviewed:  No imaging today.    Reviewed lab work that was performed 8/8/2019 which showed a ESR of 24 and CRP of 1.10, both of which within normal limits.    Assessment:  1. Prepatellar bursitis of left knee    2. Lower leg edema        Plan:  1. Healing prepatellar bursitis left knee.  No evidence of infection.  2. With her bilateral " lower leg edema (left greater than right), patient needs to be wearing compression socks/stockings.  3. She may be a candidate for Unna boot or the lymphedema clinic if it fails to improve.  4. Continue use of Aleve as needed.  5. Follow-up in 3 weeks for repeat evaluation, sooner if issues arise or symptoms worsen/change.    Patient was examined by Dr. De Dios and he agrees with the above assessment and plan.      Harper Locke PA-C  08/16/19  11:35 AM

## 2019-08-21 ENCOUNTER — TELEPHONE (OUTPATIENT)
Dept: ORTHOPEDIC SURGERY | Facility: CLINIC | Age: 79
End: 2019-08-21

## 2019-08-21 NOTE — TELEPHONE ENCOUNTER
The patient stated that her knee is a little red and swollen a bit. She mentioned that it was warm. The patient denies and fever, chills or night sweats. I asked the patient when this became concerning, she mentioned that it has never gotten better. I asked if the knee was a pinkish red or like fire truck red. She mentioned that it is only a slight reddish in color and it runs from the foot up to the knee. She asked about an antibiotic and I mentioned that we do not want to put her on an antibiotic if it is not necessary. I mentioned I would send a message to Dr. De Dios to see what he may think it is.    Lisa

## 2019-08-21 NOTE — TELEPHONE ENCOUNTER
She does not need an antibiotic. She needs a unna boot And referral to physical therapy for lymphedema. I am happy to see her tomorrow for placement of the boot and PT referral.

## 2019-08-21 NOTE — TELEPHONE ENCOUNTER
PATIENT COMPLAINS OF SLIGHT WARMTH, LITTLE REDNESS, AND PAIN OF LEFT KNEE. PATIENT DENIES ANY SWELLING.

## 2019-08-21 NOTE — TELEPHONE ENCOUNTER
"I advised the patient that per Dr. De Dios, she does not need any antibiotics at all. I let her know that she needs to have a unna boot placed on her foot and set up with physical therapy for lymphedema. The patient did let me know that her foot is too sore and is unsure about having the unna boot placed on her foot. I let her know that it is not an actual boot but it is more of a \"lotion\" wrap and she is okay with this. She advised that she is unsure if she can get anyone to bring her in the office tomorrow. I got her set up for an appointment at 11:20 tomorrow with [facility]. She stated that she is going to call me if she is unable to make it in the office either today before 4:30 or tomorrow morning.     Lisa   "

## 2019-09-17 ENCOUNTER — OFFICE VISIT (OUTPATIENT)
Dept: ORTHOPEDIC SURGERY | Facility: CLINIC | Age: 79
End: 2019-09-17

## 2019-09-17 VITALS — WEIGHT: 158 LBS | HEART RATE: 71 BPM | BODY MASS INDEX: 26.33 KG/M2 | OXYGEN SATURATION: 92 % | HEIGHT: 65 IN

## 2019-09-17 DIAGNOSIS — R60.0 LOWER LEG EDEMA: ICD-10-CM

## 2019-09-17 DIAGNOSIS — R21 SKIN RASH: ICD-10-CM

## 2019-09-17 DIAGNOSIS — M70.42 PREPATELLAR BURSITIS OF LEFT KNEE: Primary | ICD-10-CM

## 2019-09-17 PROCEDURE — 99212 OFFICE O/P EST SF 10 MIN: CPT | Performed by: PHYSICIAN ASSISTANT

## 2019-09-17 NOTE — PROGRESS NOTES
"    [facility] [place] [facility] Note    Subjective     CC: Follow-up ((3 week recheck - Prepatellar bursitis of left knee ))      HPI    Latoya Molina is a 79 y.o. female.  Patient returns today for follow-up following left knee prepatellar bursitis.  She continues to do well but still notes mild anterior knee pain with a pain scale of 4/10.  Quality the pain dull but she reports the pain is not constant.  She is currently using Tylenol with codeine and Aleve as needed.  She is using a cane for ambulation assistance.  She denies any numbness or tingling into the leg.  She reports that she has been wearing her compressive socks as previously directed.    Also patient recently tried topical homeopathic medication for her knee pain which resulted in a rash to the knee.  Since stopping the topical medication, the rash is resolving.  She has no associated itching or pain secondary to this rash.    ROS:    Constiutional:Pt denies fever, chills, nausea, or vomiting.  MSK:as above    Objective      Past Medical History  Past Medical History:   Diagnosis Date   • Arthritis    • Hyperlipidemia    • Hypertension    • Neuropathy    • Renal disorder          Physical Exam  Pulse 71   Ht 165.1 cm (65\")   Wt 71.7 kg (158 lb)   SpO2 92%   BMI 26.29 kg/m²     Body mass index is 26.29 kg/m².    Patient is well nourished and well developed.        Ortho Exam  Left knee  Skin: Resolving rash noted knee, no evidence of infection.  Tenderness: No palpable tenderness to the medial and lateral joint line.    Continues with discomfort to the prepatellar area.  No evidence of knee effusion.    Motion: 0-120 degrees  Axial load: Negative     Once again bilateral lower leg edema left greater than right but left leg is improved from what it was previously.      Imaging/Labs/EMG Reviewed:  Imaging Results (last 24 hours)     ** No results found for the last 24 hours. **      No new imaging today.      Assessment:  1. Prepatellar " bursitis of left knee    2. Lower leg edema    3. Skin rash        Plan:  1. Healed prepatellar bursitis left knee.  No evidence of infection.  2. Bilateral lower leg edema (left greater than right), patient to continue wearing compressive socks/stockings.  Formal home health PT was ordered to help assist with the lymphedema.  We discussed the possibility of an Unna boot but she is unable to return to our clinic on a weekly basis to change out the Unna boot.  For now we will see what home health PT can do to help assist.  3. Rash noted to the knee is slowly improving.  If it itches did recommend topical hydrocortisone or contact PCP for other treatment options.  4. Continue use of cane to help assist with ambulation.  5. Continue use of Aleve as needed.  6. Follow-up as needed.  7. Questions and concerns answered.    Case was discussed with Dr. De Dios who agrees the above assessment and plan.       Harper Locke PA-C  09/17/19  8:50 PM

## 2019-09-20 ENCOUNTER — TELEPHONE (OUTPATIENT)
Dept: ORTHOPEDIC SURGERY | Facility: CLINIC | Age: 79
End: 2019-09-20

## 2019-09-20 NOTE — TELEPHONE ENCOUNTER
Luis needs a verbal order for Physical Therapy, to see patient twice a week for 6 weeks starting 9/23/2019. Please call him back at [phone].

## 2019-09-20 NOTE — TELEPHONE ENCOUNTER
Called the physical therapist and spoke with Sade to advise that per Harper it was okay to start PT on 09/23/19 for 6 weeks twice a week.    Lisa

## 2024-01-01 ENCOUNTER — DOCUMENTATION (OUTPATIENT)
Dept: FAMILY MEDICINE CLINIC | Facility: CLINIC | Age: 84
End: 2024-01-01
Payer: MEDICARE

## 2024-01-01 ENCOUNTER — NURSING HOME (OUTPATIENT)
Dept: FAMILY MEDICINE CLINIC | Facility: CLINIC | Age: 84
End: 2024-01-01
Payer: MEDICARE

## 2024-01-01 VITALS
TEMPERATURE: 98.5 F | DIASTOLIC BLOOD PRESSURE: 78 MMHG | RESPIRATION RATE: 20 BRPM | SYSTOLIC BLOOD PRESSURE: 150 MMHG | HEART RATE: 92 BPM | OXYGEN SATURATION: 94 %

## 2024-01-01 DIAGNOSIS — F03.C0 SEVERE DEMENTIA WITHOUT BEHAVIORAL DISTURBANCE, PSYCHOTIC DISTURBANCE, MOOD DISTURBANCE, OR ANXIETY, UNSPECIFIED DEMENTIA TYPE: ICD-10-CM

## 2024-01-01 DIAGNOSIS — J96.00 ACUTE RESPIRATORY FAILURE, UNSPECIFIED WHETHER WITH HYPOXIA OR HYPERCAPNIA: ICD-10-CM

## 2024-01-01 DIAGNOSIS — R53.81 PHYSICAL DEBILITY: ICD-10-CM

## 2024-01-01 DIAGNOSIS — R53.81 PHYSICAL DECONDITIONING: ICD-10-CM

## 2024-01-01 DIAGNOSIS — Z51.5 HOSPICE CARE: Primary | ICD-10-CM

## 2024-01-01 PROCEDURE — 99309 SBSQ NF CARE MODERATE MDM 30: CPT | Performed by: NURSE PRACTITIONER

## 2024-01-01 RX ORDER — LORAZEPAM 0.5 MG/1
0.5 TABLET ORAL EVERY 4 HOURS PRN
COMMUNITY
End: 2024-01-01 | Stop reason: CLARIF

## 2024-01-01 RX ORDER — ONDANSETRON 4 MG/1
4 TABLET, ORALLY DISINTEGRATING ORAL EVERY 8 HOURS PRN
COMMUNITY

## 2024-01-01 RX ORDER — MORPHINE SULFATE 100 MG/5ML
10 SOLUTION ORAL
Qty: 30 ML | Refills: 0 | Status: SHIPPED | OUTPATIENT
Start: 2024-01-01

## 2024-01-01 RX ORDER — LANOLIN ALCOHOL/MO/W.PET/CERES
6 CREAM (GRAM) TOPICAL NIGHTLY PRN
COMMUNITY

## 2024-01-01 RX ORDER — ECHINACEA PURPUREA EXTRACT 125 MG
1 TABLET ORAL AS NEEDED
COMMUNITY

## 2024-01-01 RX ORDER — LORAZEPAM 2 MG/ML
0.5 CONCENTRATE ORAL EVERY 4 HOURS PRN
Qty: 30 ML | Refills: 1 | Status: SHIPPED | OUTPATIENT
Start: 2024-01-01

## 2024-01-01 RX ORDER — ACETAMINOPHEN 325 MG/1
650 TABLET ORAL EVERY 4 HOURS PRN
COMMUNITY

## 2024-10-24 RX ORDER — MORPHINE SULFATE 100 MG/5ML
5 SOLUTION ORAL EVERY 4 HOURS PRN
Qty: 30 ML | Refills: 0 | Status: SHIPPED | OUTPATIENT
Start: 2024-10-24 | End: 2024-10-26 | Stop reason: SDUPTHER

## 2024-10-26 RX ORDER — MORPHINE SULFATE 100 MG/5ML
10 SOLUTION ORAL EVERY 4 HOURS
Qty: 30 ML | Refills: 0 | Status: SHIPPED | OUTPATIENT
Start: 2024-10-26

## 2024-10-29 NOTE — PROGRESS NOTES
Nursing Home Follow Up Note      Alfonso Hernandez DO []   JOSUE Al [x]  [address] [place] [address]  Phone: [phone]  Fax: [phone] Jyoti Ríos MD []    Denzel Ureña DO []   [address] [place] [address]  Phone: [phone]  Fax: [phone]     PATIENT NAME: Latoya Molina                                                                          YOB: 1940           DATE OF SERVICE: 10/28/2024  FACILITY:  [][facility]   [] [place]   [] [facility]   [] [facility]   [] Other ______________________________________________________________________      CHIEF COMPLAINT:  Medication and chart review.       HISTORY OF PRESENT ILLNESS:     84 y.o. female with a past medical history of chronic kidney disease stage III with right nephrectomy in 2010 secondary to benign mass lesion, hypertension, chronic A-fib not chronically on anticoagulation therapy secondary to risk of falls, severe progressive dementia, restless leg syndrome, B12 deficiency, and chronic depression. She initially presented to the hospital for progressively worsening dementia.  Per family, she had been found wandering at home frequently. She lived with her daughter Mayuri and her power of  is her son Claude Vyas. Per family,  she started getting very confused not making sense in the weeks prior. Also increase weakness. No recent falls. No recent cough, shortness of breath, chest pain, nausea, vomiting, abdominal pain, diarrhea or dysuria. No recent changes to her medications. Lab work in ED unremarkable; blood gas, CBC, TSH, and procalcitonin level. UA negative for infection. Troponin I  negative. EKG showed chronic A-fib at 126 bpm with no acute ST changes. Her creatinine was slightly worsened at 1.6 from her baseline of 1.3. Chest x-ray showed probable small pleural effusions. The patient was given a dose of Lasix 20 mg IV x 1 in the emergency department.  CT of the brain showed no  acute process.     She was treated for suspected aspiration pneumonia but no improvement in mental status. Family decided to make her comfort measures only and admit her here to facility for care. She was admitted to Hospice service here in facility. She is comfortable with scheduled Morphine. Family at bedside with no concerns.       PAST MEDICAL & SURGICAL HISTORY:   Past Medical History:   Diagnosis Date    Arthritis     Hyperlipidemia     Hypertension     Neuropathy     Renal disorder       Past Surgical History:   Procedure Laterality Date    BLADDER SURGERY      CHOLECYSTECTOMY      COLONOSCOPY      HYSTERECTOMY      IMPLANTATION VAGAL NERVE STIMULATOR      NEPHRECTOMY      RENAL MASS EXCISION      REPLACEMENT TOTAL KNEE Left     2014- Dr Lee         MEDICATIONS:  I have reviewed and reconciled the patients medication list in the patients chart at the skilled nursing facility today.      ALLERGIES:    No Known Allergies      SOCIAL HISTORY:    Social History     Socioeconomic History    Marital status:    Tobacco Use    Smoking status: Never    Smokeless tobacco: Never   Substance and Sexual Activity    Alcohol use: No    Drug use: No    Sexual activity: Defer       FAMILY HISTORY:    Family History   Problem Relation Age of Onset    Coronary artery disease Neg Hx     Diabetes Neg Hx        REVIEW OF SYSTEMS:    Unable to obtain  Review of Systems      PHYSICAL EXAMINATION:   VITAL SIGNS:   Vitals:    10/28/24 1214   BP: 150/78   Pulse: 92   Resp: 20   Temp: 98.5 °F (36.9 °C)   SpO2: 94%        Physical Exam  Vitals and nursing note reviewed.   Cardiovascular:      Rate and Rhythm: Tachycardia present.   Pulmonary:      Effort: Pulmonary effort is normal. No respiratory distress.      Breath sounds: Decreased air movement present.   Abdominal:      General: Bowel sounds are decreased.   Neurological:      GCS: GCS eye subscore is 1. GCS verbal subscore is 1. GCS motor subscore is 1.   Psychiatric:          Speech: She is noncommunicative.         RECORDS REVIEW:   I have reviewed records in [facility] and [facility]    ASSESSMENT     Diagnoses and all orders for this visit:    1. Hospice care (Primary)    2. Acute respiratory failure, unspecified whether with hypoxia or hypercapnia    3. Physical debility    4. Severe dementia without behavioral disturbance, psychotic disturbance, mood disturbance, or anxiety, unspecified dementia type    5. Physical deconditioning        PLAN    Hospice care/respiratory failure/dementia/physical deconditioning  -Comfortable during visit with scheduled liquid morphine. She is not eating or drinking, or taking any other medications. Family at bedside. Hospice assisting with care as well.     Nursing and family encouraged to keep me informed of any acute changes, lack of improvement, or any new concerning symptoms.     Will follow up and continue to monitor.         [x]  Discussed Patient in detail with nursing/staff, addressed all needs today.     [x]  Plan of Care Reviewed   []  PT/OT Reviewed   []  Order Changes  []  Discharge Plans Reviewed  [x]  Advance Directive on file with Nursing Home.   [x]  POA on file with Nursing Home.   [x]  Code Status listed: []  Full Code   [x]  DNR     I spent 35 minutes caring for Latoya on this date of service. This time includes time spent by me in the following activities:preparing for the visit, reviewing tests, obtaining and/or reviewing a separately obtained history, performing a medically appropriate examination and/or evaluation , counseling and educating the patient/family/caregiver, ordering medications, tests, or procedures, referring and communicating with other health care professionals , documenting information in the medical record, independently interpreting results and communicating that information with the patient/family/caregiver, and care coordination    I confirm accuracy of unchanged data/findings which have been carried forward from  previous visit, as well as I have updated appropriately those that have changed.       JOSUE Bueno.

## 2024-10-30 ENCOUNTER — TELEPHONE (OUTPATIENT)
Dept: FAMILY MEDICINE CLINIC | Facility: CLINIC | Age: 84
End: 2024-10-30
Payer: MEDICARE

## 2024-10-30 NOTE — TELEPHONE ENCOUNTER
RECEIVED NOTIFICATION FROM THE [facility] THAT RESIDENT  ON 10/29/24 @ 7:27PM.